# Patient Record
Sex: FEMALE | Race: WHITE | NOT HISPANIC OR LATINO | Employment: UNEMPLOYED | URBAN - METROPOLITAN AREA
[De-identification: names, ages, dates, MRNs, and addresses within clinical notes are randomized per-mention and may not be internally consistent; named-entity substitution may affect disease eponyms.]

---

## 2018-04-19 ENCOUNTER — TELEPHONE (OUTPATIENT)
Dept: MEDICAL GROUP | Facility: PHYSICIAN GROUP | Age: 23
End: 2018-04-19

## 2018-04-19 NOTE — TELEPHONE ENCOUNTER
Future Appointments       Provider Department Center    4/20/2018 11:20 AM Andreina Mena M.D. [facility]        NEW PATIENT VISIT PRE-VISIT PLANNING    1.  [employer] Patient is checked in Patient Demographics? YES    2.  Immunizations were updated in [facility] using WebIZ?: Yes       •  Web Iz Recommendations: HEPATITIS A , HEPATITIS B, HPV, TDAP and VARICELLA (Chicken Pox)     3.  Is this appointment scheduled as a Hospital Follow-Up? No    4.  Patient is due for the following Health Maintenance Topics:   Health Maintenance Due   Topic Date Due   • IMM HEP B VACCINE (1 of 3 - Primary Series) 1993   • IMM HEP A VACCINE (1 of 2 - Standard Series) 04/23/1994   • IMM HPV VACCINE (1 of 3 - Female 3 Dose Series) 04/23/2004   • IMM VARICELLA (CHICKENPOX) VACCINE (1 of 2 - 2 Dose Adolescent Series) 04/23/2006   • CHLAMYDIA SCREENING  04/23/2009   • IMM DTaP/Tdap/Td Vaccine (1 - Tdap) 04/23/2012   • PAP SMEAR  04/23/2014         5.  Reviewed/Updated the following with patient:   •   Preferred Pharmacy? YES       •   Preferred Lab? YES       •   Preferred Communication? YES       •   Allergies? YES       •   Medications? YES. Was Abstract Encounter opened and chart updated? YES       •   Social History? YES. Was Abstract Encounter opened and chart updated? YES       •   Family History (document living status of immediate family members and if + hx of cancer, diabetes, hypertension, hyperlipidemia, heart attack, stroke) YES. Was Abstract Encounter opened and chart updated? YES    6.  Updated Care Team?       •   DME Company (gait device, O2, CPAP, etc.) YES       •   Other Specialists (eye doctor, derm, GYN, cardiology, endo, etc): YES    7.  MDX printed for Provider? NO     8.  Patient was informed to arrive 15 min prior to their   scheduled appointment and bring in their medication bottles.

## 2018-04-20 ENCOUNTER — OFFICE VISIT (OUTPATIENT)
Dept: MEDICAL GROUP | Facility: PHYSICIAN GROUP | Age: 23
End: 2018-04-20
Payer: COMMERCIAL

## 2018-04-20 ENCOUNTER — HOSPITAL ENCOUNTER (OUTPATIENT)
Dept: LAB | Facility: MEDICAL CENTER | Age: 23
End: 2018-04-20
Attending: INTERNAL MEDICINE
Payer: COMMERCIAL

## 2018-04-20 VITALS
DIASTOLIC BLOOD PRESSURE: 62 MMHG | SYSTOLIC BLOOD PRESSURE: 122 MMHG | TEMPERATURE: 98.1 F | WEIGHT: 147 LBS | HEART RATE: 57 BPM | BODY MASS INDEX: 24.49 KG/M2 | HEIGHT: 65 IN | OXYGEN SATURATION: 92 %

## 2018-04-20 DIAGNOSIS — Z31.69 PRE-CONCEPTION COUNSELING: ICD-10-CM

## 2018-04-20 DIAGNOSIS — N92.6 IRREGULAR PERIODS: ICD-10-CM

## 2018-04-20 DIAGNOSIS — Z13.228 ENCOUNTER FOR SCREENING FOR METABOLIC DISORDER: ICD-10-CM

## 2018-04-20 DIAGNOSIS — J30.2 CHRONIC SEASONAL ALLERGIC RHINITIS, UNSPECIFIED TRIGGER: ICD-10-CM

## 2018-04-20 DIAGNOSIS — Z23 NEED FOR VACCINATION: ICD-10-CM

## 2018-04-20 LAB
BASOPHILS # BLD AUTO: 0.6 % (ref 0–1.8)
BASOPHILS # BLD: 0.03 K/UL (ref 0–0.12)
EOSINOPHIL # BLD AUTO: 0.03 K/UL (ref 0–0.51)
EOSINOPHIL NFR BLD: 0.6 % (ref 0–6.9)
ERYTHROCYTE [DISTWIDTH] IN BLOOD BY AUTOMATED COUNT: 41.4 FL (ref 35.9–50)
HCT VFR BLD AUTO: 41.1 % (ref 37–47)
HGB BLD-MCNC: 13.5 G/DL (ref 12–16)
IMM GRANULOCYTES # BLD AUTO: 0 K/UL (ref 0–0.11)
IMM GRANULOCYTES NFR BLD AUTO: 0 % (ref 0–0.9)
LYMPHOCYTES # BLD AUTO: 2.53 K/UL (ref 1–4.8)
LYMPHOCYTES NFR BLD: 46.5 % (ref 22–41)
MCH RBC QN AUTO: 30.2 PG (ref 27–33)
MCHC RBC AUTO-ENTMCNC: 32.8 G/DL (ref 33.6–35)
MCV RBC AUTO: 91.9 FL (ref 81.4–97.8)
MONOCYTES # BLD AUTO: 0.32 K/UL (ref 0–0.85)
MONOCYTES NFR BLD AUTO: 5.9 % (ref 0–13.4)
NEUTROPHILS # BLD AUTO: 2.53 K/UL (ref 2–7.15)
NEUTROPHILS NFR BLD: 46.4 % (ref 44–72)
NRBC # BLD AUTO: 0 K/UL
NRBC BLD-RTO: 0 /100 WBC
PLATELET # BLD AUTO: 257 K/UL (ref 164–446)
PMV BLD AUTO: 10.8 FL (ref 9–12.9)
RBC # BLD AUTO: 4.47 M/UL (ref 4.2–5.4)
WBC # BLD AUTO: 5.4 K/UL (ref 4.8–10.8)

## 2018-04-20 PROCEDURE — 84443 ASSAY THYROID STIM HORMONE: CPT

## 2018-04-20 PROCEDURE — 90686 IIV4 VACC NO PRSV 0.5 ML IM: CPT | Performed by: INTERNAL MEDICINE

## 2018-04-20 PROCEDURE — 90471 IMMUNIZATION ADMIN: CPT | Performed by: INTERNAL MEDICINE

## 2018-04-20 PROCEDURE — 36415 COLL VENOUS BLD VENIPUNCTURE: CPT

## 2018-04-20 PROCEDURE — 99204 OFFICE O/P NEW MOD 45 MIN: CPT | Mod: 25 | Performed by: INTERNAL MEDICINE

## 2018-04-20 PROCEDURE — 85025 COMPLETE CBC W/AUTO DIFF WBC: CPT

## 2018-04-20 RX ORDER — PRENATAL VIT 49/IRON FUM/FOLIC 6.75-0.2MG
6 TABLET ORAL DAILY
Qty: 60 TAB | Refills: 2
Start: 2018-04-20

## 2018-04-20 ASSESSMENT — PATIENT HEALTH QUESTIONNAIRE - PHQ9: CLINICAL INTERPRETATION OF PHQ2 SCORE: 0

## 2018-04-20 NOTE — ASSESSMENT & PLAN NOTE
This is a chronic health problem that is well controlled with current medications and lifestyle measures. On over-the-counter Claritin.

## 2018-04-20 NOTE — ASSESSMENT & PLAN NOTE
This is a chronic health problem that is uncontrolled with current medications and lifestyle measures. Was on the hormonal pills, but trying to conceive and is off the medication since last June 2017. Happens once in every 2 months, the flow being moderate only the first 2 days being heavy and ending by 7 th day. Has on and off abdominal pain midcycle.

## 2018-04-21 LAB — TSH SERPL DL<=0.005 MIU/L-ACNC: 1.97 UIU/ML (ref 0.38–5.33)

## 2018-04-23 ENCOUNTER — TELEPHONE (OUTPATIENT)
Dept: MEDICAL GROUP | Facility: PHYSICIAN GROUP | Age: 23
End: 2018-04-23

## 2018-04-23 NOTE — TELEPHONE ENCOUNTER
----- Message from Andreina Mena M.D. sent at 4/21/2018 12:22 PM PDT -----  All of your labs were normal. You may see some that are highlighted, however these have no clinical significance.  Andreina Mena M.D.

## 2018-04-27 ENCOUNTER — HOSPITAL ENCOUNTER (OUTPATIENT)
Dept: RADIOLOGY | Facility: MEDICAL CENTER | Age: 23
End: 2018-04-27
Attending: INTERNAL MEDICINE
Payer: COMMERCIAL

## 2018-04-27 ENCOUNTER — HOSPITAL ENCOUNTER (OUTPATIENT)
Dept: LAB | Facility: MEDICAL CENTER | Age: 23
End: 2018-04-27
Attending: INTERNAL MEDICINE
Payer: COMMERCIAL

## 2018-04-27 DIAGNOSIS — Z13.228 ENCOUNTER FOR SCREENING FOR METABOLIC DISORDER: ICD-10-CM

## 2018-04-27 DIAGNOSIS — N92.6 IRREGULAR PERIODS: ICD-10-CM

## 2018-04-27 LAB
ALBUMIN SERPL BCP-MCNC: 4.4 G/DL (ref 3.2–4.9)
ALBUMIN/GLOB SERPL: 1.7 G/DL
ALP SERPL-CCNC: 38 U/L (ref 30–99)
ALT SERPL-CCNC: 9 U/L (ref 2–50)
ANION GAP SERPL CALC-SCNC: 7 MMOL/L (ref 0–11.9)
AST SERPL-CCNC: 14 U/L (ref 12–45)
BILIRUB SERPL-MCNC: 0.7 MG/DL (ref 0.1–1.5)
BUN SERPL-MCNC: 17 MG/DL (ref 8–22)
CALCIUM SERPL-MCNC: 8.8 MG/DL (ref 8.5–10.5)
CHLORIDE SERPL-SCNC: 106 MMOL/L (ref 96–112)
CO2 SERPL-SCNC: 26 MMOL/L (ref 20–33)
CREAT SERPL-MCNC: 0.56 MG/DL (ref 0.5–1.4)
GLOBULIN SER CALC-MCNC: 2.6 G/DL (ref 1.9–3.5)
GLUCOSE SERPL-MCNC: 88 MG/DL (ref 65–99)
POTASSIUM SERPL-SCNC: 4.1 MMOL/L (ref 3.6–5.5)
PROT SERPL-MCNC: 7 G/DL (ref 6–8.2)
SODIUM SERPL-SCNC: 139 MMOL/L (ref 135–145)

## 2018-04-27 PROCEDURE — 36415 COLL VENOUS BLD VENIPUNCTURE: CPT

## 2018-04-27 PROCEDURE — 76830 TRANSVAGINAL US NON-OB: CPT

## 2018-04-27 PROCEDURE — 80053 COMPREHEN METABOLIC PANEL: CPT

## 2018-04-28 DIAGNOSIS — N92.6 IRREGULAR PERIODS: ICD-10-CM

## 2018-04-28 DIAGNOSIS — E28.2 PCOS (POLYCYSTIC OVARIAN SYNDROME): ICD-10-CM

## 2018-04-30 ENCOUNTER — TELEPHONE (OUTPATIENT)
Dept: MEDICAL GROUP | Facility: PHYSICIAN GROUP | Age: 23
End: 2018-04-30

## 2018-04-30 NOTE — TELEPHONE ENCOUNTER
----- Message from Andreina Mena M.D. sent at 4/28/2018  4:20 PM PDT -----  Your Transvaginal Ultrasound is positive for PCOS , I am giving referral to Gynecology for further management.  Andreina Mena M.D.

## 2018-04-30 NOTE — TELEPHONE ENCOUNTER
----- Message from Anderina Mena M.D. sent at 4/28/2018  4:00 PM PDT -----  All of your labs were normal. You may see some that are highlighted, however these have no clinical significance.  Andreina Mena M.D.

## 2018-08-06 ENCOUNTER — HOSPITAL ENCOUNTER (OUTPATIENT)
Dept: LAB | Facility: MEDICAL CENTER | Age: 23
End: 2018-08-06
Attending: OBSTETRICS & GYNECOLOGY
Payer: COMMERCIAL

## 2018-08-06 LAB — B-HCG SERPL-ACNC: 3973.4 MIU/ML (ref 0–5)

## 2018-08-06 PROCEDURE — 36415 COLL VENOUS BLD VENIPUNCTURE: CPT

## 2018-08-06 PROCEDURE — 84702 CHORIONIC GONADOTROPIN TEST: CPT

## 2018-08-08 ENCOUNTER — HOSPITAL ENCOUNTER (OUTPATIENT)
Dept: LAB | Facility: MEDICAL CENTER | Age: 23
End: 2018-08-08
Attending: OBSTETRICS & GYNECOLOGY
Payer: COMMERCIAL

## 2018-08-08 LAB — B-HCG SERPL-ACNC: 8502 MIU/ML (ref 0–5)

## 2018-08-08 PROCEDURE — 36415 COLL VENOUS BLD VENIPUNCTURE: CPT

## 2018-08-08 PROCEDURE — 84702 CHORIONIC GONADOTROPIN TEST: CPT

## 2018-09-28 ENCOUNTER — HOSPITAL ENCOUNTER (OUTPATIENT)
Dept: LAB | Facility: MEDICAL CENTER | Age: 23
End: 2018-09-28
Attending: OBSTETRICS & GYNECOLOGY
Payer: COMMERCIAL

## 2018-09-28 PROCEDURE — 36415 COLL VENOUS BLD VENIPUNCTURE: CPT

## 2018-09-28 PROCEDURE — 81508 FTL CGEN ABNOR TWO PROTEINS: CPT

## 2018-10-01 LAB
# FETUSES US: NORMAL
AGE - REPORTED: 25.9 YR
CURRENT SMOKER: NO
FET CRL US.MEAS: 66.2 MM
FET CRL US.MEAS: NORMAL MM
FET NUCHAL FOLD MOM THICKNESS US.MEAS: 1.08
FET NUCHAL FOLD MOM THICKNESS US.MEAS: NORMAL
FET NUCHAL FOLD THICKNESS US.MEAS: 1.67 MM
GA: NORMAL WK
HCG MOM SERPL: 1.05
HCG SERPL-ACNC: NORMAL IU/L
HX OF HEREDITARY DISORDERS: NO
INTEGRATED SCN PATIENT-IMP: NORMAL
NUCHAL TRANSLUCENCY (NT), TWIN B Q0252: NORMAL MM
PAPP-A MOM SERPL: 0.38
PAPP-A SERPL-MCNC: 414.1 NG/ML
PATHOLOGY STUDY: NORMAL
SONOGRAPHER NAME: NORMAL
SONOGRAPHER: NORMAL
SPECIMEN DRAWN SERPL: NORMAL
US DATE: NORMAL

## 2018-11-02 ENCOUNTER — HOSPITAL ENCOUNTER (OUTPATIENT)
Dept: LAB | Facility: MEDICAL CENTER | Age: 23
End: 2018-11-02
Attending: OBSTETRICS & GYNECOLOGY
Payer: COMMERCIAL

## 2018-11-02 LAB
ABO GROUP BLD: NORMAL
APPEARANCE UR: CLEAR
BASOPHILS # BLD AUTO: 0.3 % (ref 0–1.8)
BASOPHILS # BLD: 0.03 K/UL (ref 0–0.12)
BILIRUB UR QL STRIP.AUTO: NEGATIVE
BLD GP AB SCN SERPL QL: NORMAL
COLOR UR: YELLOW
EOSINOPHIL # BLD AUTO: 0.03 K/UL (ref 0–0.51)
EOSINOPHIL NFR BLD: 0.3 % (ref 0–6.9)
ERYTHROCYTE [DISTWIDTH] IN BLOOD BY AUTOMATED COUNT: 41.4 FL (ref 35.9–50)
GLUCOSE UR STRIP.AUTO-MCNC: NEGATIVE MG/DL
HBV SURFACE AG SER QL: NEGATIVE
HCT VFR BLD AUTO: 36.5 % (ref 37–47)
HGB BLD-MCNC: 12.8 G/DL (ref 12–16)
HIV 1+2 AB+HIV1 P24 AG SERPL QL IA: NON REACTIVE
IMM GRANULOCYTES # BLD AUTO: 0.03 K/UL (ref 0–0.11)
IMM GRANULOCYTES NFR BLD AUTO: 0.3 % (ref 0–0.9)
KETONES UR STRIP.AUTO-MCNC: NEGATIVE MG/DL
LEUKOCYTE ESTERASE UR QL STRIP.AUTO: NEGATIVE
LYMPHOCYTES # BLD AUTO: 1.72 K/UL (ref 1–4.8)
LYMPHOCYTES NFR BLD: 17.9 % (ref 22–41)
MCH RBC QN AUTO: 32.3 PG (ref 27–33)
MCHC RBC AUTO-ENTMCNC: 35.1 G/DL (ref 33.6–35)
MCV RBC AUTO: 92.2 FL (ref 81.4–97.8)
MICRO URNS: NORMAL
MONOCYTES # BLD AUTO: 0.41 K/UL (ref 0–0.85)
MONOCYTES NFR BLD AUTO: 4.3 % (ref 0–13.4)
NEUTROPHILS # BLD AUTO: 7.37 K/UL (ref 2–7.15)
NEUTROPHILS NFR BLD: 76.9 % (ref 44–72)
NITRITE UR QL STRIP.AUTO: NEGATIVE
NRBC # BLD AUTO: 0 K/UL
NRBC BLD-RTO: 0 /100 WBC
PH UR STRIP.AUTO: 8 [PH]
PLATELET # BLD AUTO: 249 K/UL (ref 164–446)
PMV BLD AUTO: 11.2 FL (ref 9–12.9)
PROT UR QL STRIP: NEGATIVE MG/DL
RBC # BLD AUTO: 3.96 M/UL (ref 4.2–5.4)
RBC UR QL AUTO: NEGATIVE
RH BLD: NORMAL
RUBV AB SER QL: 106.6 IU/ML
SP GR UR STRIP.AUTO: 1.01
TREPONEMA PALLIDUM IGG+IGM AB [PRESENCE] IN SERUM OR PLASMA BY IMMUNOASSAY: NON REACTIVE
UROBILINOGEN UR STRIP.AUTO-MCNC: 1 MG/DL
WBC # BLD AUTO: 9.6 K/UL (ref 4.8–10.8)

## 2018-11-02 PROCEDURE — 86900 BLOOD TYPING SEROLOGIC ABO: CPT

## 2018-11-02 PROCEDURE — 87086 URINE CULTURE/COLONY COUNT: CPT

## 2018-11-02 PROCEDURE — 86762 RUBELLA ANTIBODY: CPT

## 2018-11-02 PROCEDURE — 87389 HIV-1 AG W/HIV-1&-2 AB AG IA: CPT

## 2018-11-02 PROCEDURE — 86850 RBC ANTIBODY SCREEN: CPT

## 2018-11-02 PROCEDURE — 87340 HEPATITIS B SURFACE AG IA: CPT

## 2018-11-02 PROCEDURE — 86901 BLOOD TYPING SEROLOGIC RH(D): CPT

## 2018-11-02 PROCEDURE — 36415 COLL VENOUS BLD VENIPUNCTURE: CPT

## 2018-11-02 PROCEDURE — 82105 ALPHA-FETOPROTEIN SERUM: CPT

## 2018-11-02 PROCEDURE — 86780 TREPONEMA PALLIDUM: CPT

## 2018-11-02 PROCEDURE — 85025 COMPLETE CBC W/AUTO DIFF WBC: CPT

## 2018-11-02 PROCEDURE — 81003 URINALYSIS AUTO W/O SCOPE: CPT

## 2018-11-05 LAB
BACTERIA UR CULT: NORMAL
SIGNIFICANT IND 70042: NORMAL
SITE SITE: NORMAL
SOURCE SOURCE: NORMAL

## 2018-11-06 LAB
# FETUSES US: NORMAL
AFP MOM SERPL: 0.84
AFP SERPL-MCNC: 34 NG/ML
AGE - REPORTED: 26 YR
CURRENT SMOKER: NO
FAMILY MEMBER DISEASES HX: NO
GA METHOD: NORMAL
GA: NORMAL WK
IDDM PATIENT QL: NO
INTEGRATED SCN PATIENT-IMP: NORMAL
SPECIMEN DRAWN SERPL: NORMAL

## 2018-12-19 ENCOUNTER — HOSPITAL ENCOUNTER (OUTPATIENT)
Dept: LAB | Facility: MEDICAL CENTER | Age: 23
End: 2018-12-19
Attending: OBSTETRICS & GYNECOLOGY
Payer: COMMERCIAL

## 2018-12-19 LAB
GLUCOSE 1H P 50 G GLC PO SERPL-MCNC: 127 MG/DL (ref 70–139)
HCT VFR BLD AUTO: 35.4 % (ref 37–47)
HGB BLD-MCNC: 12.2 G/DL (ref 12–16)
PLATELET # BLD AUTO: 247 K/UL (ref 164–446)

## 2018-12-19 PROCEDURE — 36415 COLL VENOUS BLD VENIPUNCTURE: CPT

## 2018-12-19 PROCEDURE — 86780 TREPONEMA PALLIDUM: CPT

## 2018-12-19 PROCEDURE — 82950 GLUCOSE TEST: CPT

## 2018-12-19 PROCEDURE — 85018 HEMOGLOBIN: CPT

## 2018-12-19 PROCEDURE — 85049 AUTOMATED PLATELET COUNT: CPT

## 2018-12-19 PROCEDURE — 85014 HEMATOCRIT: CPT

## 2018-12-20 LAB — TREPONEMA PALLIDUM IGG+IGM AB [PRESENCE] IN SERUM OR PLASMA BY IMMUNOASSAY: NON REACTIVE

## 2019-01-24 ENCOUNTER — APPOINTMENT (OUTPATIENT)
Dept: OTHER | Facility: IMAGING CENTER | Age: 24
End: 2019-01-24

## 2019-02-22 ENCOUNTER — HOSPITAL ENCOUNTER (OUTPATIENT)
Dept: LAB | Facility: MEDICAL CENTER | Age: 24
End: 2019-02-22
Attending: OBSTETRICS & GYNECOLOGY
Payer: COMMERCIAL

## 2019-02-22 PROCEDURE — 87150 DNA/RNA AMPLIFIED PROBE: CPT

## 2019-02-22 PROCEDURE — 87081 CULTURE SCREEN ONLY: CPT

## 2019-02-23 LAB — GP B STREP DNA SPEC QL NAA+PROBE: NEGATIVE

## 2019-02-28 ENCOUNTER — HOSPITAL ENCOUNTER (INPATIENT)
Facility: MEDICAL CENTER | Age: 24
LOS: 4 days | End: 2019-03-05
Attending: OBSTETRICS & GYNECOLOGY | Admitting: OBSTETRICS & GYNECOLOGY
Payer: COMMERCIAL

## 2019-02-28 DIAGNOSIS — Z09 SURGERY FOLLOW-UP: ICD-10-CM

## 2019-02-28 LAB — HOLDING TUBE BB 8507: NORMAL

## 2019-02-28 PROCEDURE — 81002 URINALYSIS NONAUTO W/O SCOPE: CPT

## 2019-02-28 PROCEDURE — 85025 COMPLETE CBC W/AUTO DIFF WBC: CPT

## 2019-02-28 PROCEDURE — 700105 HCHG RX REV CODE 258: Performed by: OBSTETRICS & GYNECOLOGY

## 2019-02-28 PROCEDURE — 84550 ASSAY OF BLOOD/URIC ACID: CPT

## 2019-02-28 PROCEDURE — 80053 COMPREHEN METABOLIC PANEL: CPT

## 2019-02-28 RX ORDER — HYDRALAZINE HYDROCHLORIDE 20 MG/ML
5-10 INJECTION INTRAMUSCULAR; INTRAVENOUS PRN
Status: DISCONTINUED | OUTPATIENT
Start: 2019-02-28 | End: 2019-03-01 | Stop reason: HOSPADM

## 2019-02-28 RX ORDER — BETAMETHASONE SODIUM PHOSPHATE AND BETAMETHASONE ACETATE 3; 3 MG/ML; MG/ML
12 INJECTION, SUSPENSION INTRA-ARTICULAR; INTRALESIONAL; INTRAMUSCULAR; SOFT TISSUE EVERY 24 HOURS
Status: DISCONTINUED | OUTPATIENT
Start: 2019-03-01 | End: 2019-03-01

## 2019-02-28 RX ORDER — LABETALOL HYDROCHLORIDE 5 MG/ML
20-80 INJECTION, SOLUTION INTRAVENOUS PRN
Status: DISCONTINUED | OUTPATIENT
Start: 2019-02-28 | End: 2019-03-01 | Stop reason: HOSPADM

## 2019-02-28 RX ADMIN — SODIUM CHLORIDE, POTASSIUM CHLORIDE, SODIUM LACTATE AND CALCIUM CHLORIDE: 600; 310; 30; 20 INJECTION, SOLUTION INTRAVENOUS at 12:53

## 2019-03-01 LAB
ALBUMIN SERPL BCP-MCNC: 2.9 G/DL (ref 3.2–4.9)
ALBUMIN SERPL BCP-MCNC: 3.2 G/DL (ref 3.2–4.9)
ALBUMIN/GLOB SERPL: 1.1 G/DL
ALBUMIN/GLOB SERPL: 1.5 G/DL
ALP SERPL-CCNC: 121 U/L (ref 30–99)
ALP SERPL-CCNC: 94 U/L (ref 30–99)
ALT SERPL-CCNC: 129 U/L (ref 2–50)
ALT SERPL-CCNC: 168 U/L (ref 2–50)
ANION GAP SERPL CALC-SCNC: 10 MMOL/L (ref 0–11.9)
ANION GAP SERPL CALC-SCNC: 6 MMOL/L (ref 0–11.9)
APPEARANCE UR: ABNORMAL
AST SERPL-CCNC: 209 U/L (ref 12–45)
AST SERPL-CCNC: 99 U/L (ref 12–45)
BASOPHILS # BLD AUTO: 0.2 % (ref 0–1.8)
BASOPHILS # BLD AUTO: 0.3 % (ref 0–1.8)
BASOPHILS # BLD: 0.04 K/UL (ref 0–0.12)
BASOPHILS # BLD: 0.06 K/UL (ref 0–0.12)
BILIRUB SERPL-MCNC: 0.6 MG/DL (ref 0.1–1.5)
BILIRUB SERPL-MCNC: 1.5 MG/DL (ref 0.1–1.5)
BUN SERPL-MCNC: 10 MG/DL (ref 8–22)
BUN SERPL-MCNC: 14 MG/DL (ref 8–22)
CALCIUM SERPL-MCNC: 7 MG/DL (ref 8.5–10.5)
CALCIUM SERPL-MCNC: 8.5 MG/DL (ref 8.5–10.5)
CHLORIDE SERPL-SCNC: 105 MMOL/L (ref 96–112)
CHLORIDE SERPL-SCNC: 108 MMOL/L (ref 96–112)
CO2 SERPL-SCNC: 18 MMOL/L (ref 20–33)
CO2 SERPL-SCNC: 20 MMOL/L (ref 20–33)
COLOR UR AUTO: ABNORMAL
CREAT SERPL-MCNC: 0.64 MG/DL (ref 0.5–1.4)
CREAT SERPL-MCNC: 0.65 MG/DL (ref 0.5–1.4)
EOSINOPHIL # BLD AUTO: 0 K/UL (ref 0–0.51)
EOSINOPHIL # BLD AUTO: 0.02 K/UL (ref 0–0.51)
EOSINOPHIL NFR BLD: 0 % (ref 0–6.9)
EOSINOPHIL NFR BLD: 0.1 % (ref 0–6.9)
ERYTHROCYTE [DISTWIDTH] IN BLOOD BY AUTOMATED COUNT: 43.4 FL (ref 35.9–50)
ERYTHROCYTE [DISTWIDTH] IN BLOOD BY AUTOMATED COUNT: 45.4 FL (ref 35.9–50)
GLOBULIN SER CALC-MCNC: 2 G/DL (ref 1.9–3.5)
GLOBULIN SER CALC-MCNC: 2.8 G/DL (ref 1.9–3.5)
GLUCOSE SERPL-MCNC: 131 MG/DL (ref 65–99)
GLUCOSE SERPL-MCNC: 89 MG/DL (ref 65–99)
GLUCOSE UR QL STRIP.AUTO: NEGATIVE MG/DL
HCT VFR BLD AUTO: 28.8 % (ref 37–47)
HCT VFR BLD AUTO: 37.1 % (ref 37–47)
HGB BLD-MCNC: 10.1 G/DL (ref 12–16)
HGB BLD-MCNC: 13.1 G/DL (ref 12–16)
IMM GRANULOCYTES # BLD AUTO: 0.12 K/UL (ref 0–0.11)
IMM GRANULOCYTES # BLD AUTO: 0.13 K/UL (ref 0–0.11)
IMM GRANULOCYTES NFR BLD AUTO: 0.7 % (ref 0–0.9)
IMM GRANULOCYTES NFR BLD AUTO: 0.8 % (ref 0–0.9)
KETONES UR QL STRIP.AUTO: ABNORMAL MG/DL
LEUKOCYTE ESTERASE UR QL STRIP.AUTO: NEGATIVE
LYMPHOCYTES # BLD AUTO: 1.35 K/UL (ref 1–4.8)
LYMPHOCYTES # BLD AUTO: 1.64 K/UL (ref 1–4.8)
LYMPHOCYTES NFR BLD: 7.9 % (ref 22–41)
LYMPHOCYTES NFR BLD: 9.4 % (ref 22–41)
MAGNESIUM SERPL-MCNC: 4 MG/DL (ref 1.5–2.5)
MAGNESIUM SERPL-MCNC: 4.6 MG/DL (ref 1.5–2.5)
MAGNESIUM SERPL-MCNC: 5.3 MG/DL (ref 1.5–2.5)
MAGNESIUM SERPL-MCNC: 6.1 MG/DL (ref 1.5–2.5)
MCH RBC QN AUTO: 32.3 PG (ref 27–33)
MCH RBC QN AUTO: 32.7 PG (ref 27–33)
MCHC RBC AUTO-ENTMCNC: 35.1 G/DL (ref 33.6–35)
MCHC RBC AUTO-ENTMCNC: 35.3 G/DL (ref 33.6–35)
MCV RBC AUTO: 91.6 FL (ref 81.4–97.8)
MCV RBC AUTO: 93.2 FL (ref 81.4–97.8)
MONOCYTES # BLD AUTO: 0.6 K/UL (ref 0–0.85)
MONOCYTES # BLD AUTO: 0.97 K/UL (ref 0–0.85)
MONOCYTES NFR BLD AUTO: 3.5 % (ref 0–13.4)
MONOCYTES NFR BLD AUTO: 5.7 % (ref 0–13.4)
NEUTROPHILS # BLD AUTO: 14.48 K/UL (ref 2–7.15)
NEUTROPHILS # BLD AUTO: 14.97 K/UL (ref 2–7.15)
NEUTROPHILS NFR BLD: 85.3 % (ref 44–72)
NEUTROPHILS NFR BLD: 86.1 % (ref 44–72)
NITRITE UR QL STRIP.AUTO: NEGATIVE
NRBC # BLD AUTO: 0 K/UL
NRBC # BLD AUTO: 0.02 K/UL
NRBC BLD-RTO: 0 /100 WBC
NRBC BLD-RTO: 0.1 /100 WBC
PH UR STRIP.AUTO: 5 [PH]
PLATELET # BLD AUTO: 53 K/UL (ref 164–446)
PLATELET # BLD AUTO: 61 K/UL (ref 164–446)
PMV BLD AUTO: 12.9 FL (ref 9–12.9)
PMV BLD AUTO: 13.7 FL (ref 9–12.9)
POTASSIUM SERPL-SCNC: 3.8 MMOL/L (ref 3.6–5.5)
POTASSIUM SERPL-SCNC: 4.2 MMOL/L (ref 3.6–5.5)
PROT SERPL-MCNC: 4.9 G/DL (ref 6–8.2)
PROT SERPL-MCNC: 6 G/DL (ref 6–8.2)
PROT UR QL STRIP: >=300 MG/DL
RBC # BLD AUTO: 3.09 M/UL (ref 4.2–5.4)
RBC # BLD AUTO: 4.05 M/UL (ref 4.2–5.4)
RBC UR QL AUTO: ABNORMAL
SODIUM SERPL-SCNC: 131 MMOL/L (ref 135–145)
SODIUM SERPL-SCNC: 136 MMOL/L (ref 135–145)
SP GR UR: >=1.03
URATE SERPL-MCNC: 6.1 MG/DL (ref 1.9–8.2)
WBC # BLD AUTO: 17 K/UL (ref 4.8–10.8)
WBC # BLD AUTO: 17.4 K/UL (ref 4.8–10.8)

## 2019-03-01 PROCEDURE — A9270 NON-COVERED ITEM OR SERVICE: HCPCS | Performed by: ANESTHESIOLOGY

## 2019-03-01 PROCEDURE — 83735 ASSAY OF MAGNESIUM: CPT

## 2019-03-01 PROCEDURE — 700111 HCHG RX REV CODE 636 W/ 250 OVERRIDE (IP): Performed by: OBSTETRICS & GYNECOLOGY

## 2019-03-01 PROCEDURE — 700112 HCHG RX REV CODE 229: Performed by: OBSTETRICS & GYNECOLOGY

## 2019-03-01 PROCEDURE — 304964 HCHG RECOVERY ROOM TIME 1HR: Performed by: OBSTETRICS & GYNECOLOGY

## 2019-03-01 PROCEDURE — 305385 HCHG SURGICAL SERVICES 1/4 HOUR: Performed by: OBSTETRICS & GYNECOLOGY

## 2019-03-01 PROCEDURE — 700101 HCHG RX REV CODE 250

## 2019-03-01 PROCEDURE — 700105 HCHG RX REV CODE 258: Performed by: OBSTETRICS & GYNECOLOGY

## 2019-03-01 PROCEDURE — 306828 HCHG ANES-TIME GENERAL: Performed by: OBSTETRICS & GYNECOLOGY

## 2019-03-01 PROCEDURE — 80053 COMPREHEN METABOLIC PANEL: CPT

## 2019-03-01 PROCEDURE — 700102 HCHG RX REV CODE 250 W/ 637 OVERRIDE(OP): Performed by: ANESTHESIOLOGY

## 2019-03-01 PROCEDURE — 770002 HCHG ROOM/CARE - OB PRIVATE (112)

## 2019-03-01 PROCEDURE — 700101 HCHG RX REV CODE 250: Performed by: OBSTETRICS & GYNECOLOGY

## 2019-03-01 PROCEDURE — 700111 HCHG RX REV CODE 636 W/ 250 OVERRIDE (IP)

## 2019-03-01 PROCEDURE — 96372 THER/PROPH/DIAG INJ SC/IM: CPT

## 2019-03-01 PROCEDURE — 85025 COMPLETE CBC W/AUTO DIFF WBC: CPT

## 2019-03-01 PROCEDURE — 304966 HCHG RECOVERY SVSC TIME ADDL 1/2 HR: Performed by: OBSTETRICS & GYNECOLOGY

## 2019-03-01 PROCEDURE — 700111 HCHG RX REV CODE 636 W/ 250 OVERRIDE (IP): Performed by: ANESTHESIOLOGY

## 2019-03-01 PROCEDURE — A9270 NON-COVERED ITEM OR SERVICE: HCPCS | Performed by: OBSTETRICS & GYNECOLOGY

## 2019-03-01 PROCEDURE — 700102 HCHG RX REV CODE 250 W/ 637 OVERRIDE(OP): Performed by: OBSTETRICS & GYNECOLOGY

## 2019-03-01 PROCEDURE — 59514 CESAREAN DELIVERY ONLY: CPT

## 2019-03-01 PROCEDURE — 36415 COLL VENOUS BLD VENIPUNCTURE: CPT

## 2019-03-01 RX ORDER — ONDANSETRON 2 MG/ML
4 INJECTION INTRAMUSCULAR; INTRAVENOUS
Status: DISCONTINUED | OUTPATIENT
Start: 2019-03-01 | End: 2019-03-01 | Stop reason: HOSPADM

## 2019-03-01 RX ORDER — MISOPROSTOL 200 UG/1
800 TABLET ORAL
Status: DISCONTINUED | OUTPATIENT
Start: 2019-03-01 | End: 2019-03-05 | Stop reason: HOSPADM

## 2019-03-01 RX ORDER — SODIUM CHLORIDE, SODIUM LACTATE, POTASSIUM CHLORIDE, CALCIUM CHLORIDE 600; 310; 30; 20 MG/100ML; MG/100ML; MG/100ML; MG/100ML
INJECTION, SOLUTION INTRAVENOUS CONTINUOUS
Status: DISCONTINUED | OUTPATIENT
Start: 2019-03-01 | End: 2019-03-05 | Stop reason: HOSPADM

## 2019-03-01 RX ORDER — HYDRALAZINE HYDROCHLORIDE 20 MG/ML
5 INJECTION INTRAMUSCULAR; INTRAVENOUS
Status: DISCONTINUED | OUTPATIENT
Start: 2019-03-01 | End: 2019-03-01 | Stop reason: HOSPADM

## 2019-03-01 RX ORDER — SODIUM CHLORIDE, SODIUM GLUCONATE, SODIUM ACETATE, POTASSIUM CHLORIDE AND MAGNESIUM CHLORIDE 526; 502; 368; 37; 30 MG/100ML; MG/100ML; MG/100ML; MG/100ML; MG/100ML
1500 INJECTION, SOLUTION INTRAVENOUS ONCE
Status: DISCONTINUED | OUTPATIENT
Start: 2019-03-01 | End: 2019-03-01 | Stop reason: HOSPADM

## 2019-03-01 RX ORDER — OXYCODONE HYDROCHLORIDE 5 MG/1
5 TABLET ORAL EVERY 4 HOURS PRN
Status: DISCONTINUED | OUTPATIENT
Start: 2019-03-01 | End: 2019-03-05 | Stop reason: HOSPADM

## 2019-03-01 RX ORDER — MORPHINE SULFATE 4 MG/ML
4 INJECTION, SOLUTION INTRAMUSCULAR; INTRAVENOUS
Status: DISCONTINUED | OUTPATIENT
Start: 2019-03-01 | End: 2019-03-05 | Stop reason: HOSPADM

## 2019-03-01 RX ORDER — OXYCODONE HYDROCHLORIDE AND ACETAMINOPHEN 5; 325 MG/1; MG/1
1 TABLET ORAL
Status: COMPLETED | OUTPATIENT
Start: 2019-03-01 | End: 2019-03-01

## 2019-03-01 RX ORDER — OXYCODONE HYDROCHLORIDE AND ACETAMINOPHEN 5; 325 MG/1; MG/1
2 TABLET ORAL
Status: COMPLETED | OUTPATIENT
Start: 2019-03-01 | End: 2019-03-01

## 2019-03-01 RX ORDER — METOCLOPRAMIDE HYDROCHLORIDE 5 MG/ML
10 INJECTION INTRAMUSCULAR; INTRAVENOUS ONCE
Status: COMPLETED | OUTPATIENT
Start: 2019-03-01 | End: 2019-03-01

## 2019-03-01 RX ORDER — SODIUM CHLORIDE, SODIUM LACTATE, POTASSIUM CHLORIDE, CALCIUM CHLORIDE 600; 310; 30; 20 MG/100ML; MG/100ML; MG/100ML; MG/100ML
INJECTION, SOLUTION INTRAVENOUS PRN
Status: DISCONTINUED | OUTPATIENT
Start: 2019-03-01 | End: 2019-03-05 | Stop reason: HOSPADM

## 2019-03-01 RX ORDER — LORAZEPAM 2 MG/ML
0.5 INJECTION INTRAMUSCULAR
Status: DISCONTINUED | OUTPATIENT
Start: 2019-03-01 | End: 2019-03-01 | Stop reason: HOSPADM

## 2019-03-01 RX ORDER — DIPHENHYDRAMINE HYDROCHLORIDE 50 MG/ML
12.5 INJECTION INTRAMUSCULAR; INTRAVENOUS
Status: DISCONTINUED | OUTPATIENT
Start: 2019-03-01 | End: 2019-03-01 | Stop reason: HOSPADM

## 2019-03-01 RX ORDER — MAGNESIUM SULFATE HEPTAHYDRATE 40 MG/ML
INJECTION, SOLUTION INTRAVENOUS
Status: COMPLETED
Start: 2019-03-01 | End: 2019-03-01

## 2019-03-01 RX ORDER — IBUPROFEN 600 MG/1
600 TABLET ORAL EVERY 6 HOURS PRN
Status: DISCONTINUED | OUTPATIENT
Start: 2019-03-01 | End: 2019-03-05 | Stop reason: HOSPADM

## 2019-03-01 RX ORDER — ONDANSETRON 2 MG/ML
4 INJECTION INTRAMUSCULAR; INTRAVENOUS EVERY 6 HOURS PRN
Status: DISCONTINUED | OUTPATIENT
Start: 2019-03-01 | End: 2019-03-05 | Stop reason: HOSPADM

## 2019-03-01 RX ORDER — CALCIUM GLUCONATE 94 MG/ML
1 INJECTION, SOLUTION INTRAVENOUS
Status: DISCONTINUED | OUTPATIENT
Start: 2019-03-01 | End: 2019-03-01 | Stop reason: HOSPADM

## 2019-03-01 RX ORDER — METOPROLOL TARTRATE 1 MG/ML
1 INJECTION, SOLUTION INTRAVENOUS
Status: DISCONTINUED | OUTPATIENT
Start: 2019-03-01 | End: 2019-03-01 | Stop reason: HOSPADM

## 2019-03-01 RX ORDER — HALOPERIDOL 5 MG/ML
1 INJECTION INTRAMUSCULAR
Status: DISCONTINUED | OUTPATIENT
Start: 2019-03-01 | End: 2019-03-01 | Stop reason: HOSPADM

## 2019-03-01 RX ORDER — SIMETHICONE 80 MG
80 TABLET,CHEWABLE ORAL 4 TIMES DAILY PRN
Status: DISCONTINUED | OUTPATIENT
Start: 2019-03-01 | End: 2019-03-05 | Stop reason: HOSPADM

## 2019-03-01 RX ORDER — MEPERIDINE HYDROCHLORIDE 25 MG/ML
12.5 INJECTION INTRAMUSCULAR; INTRAVENOUS; SUBCUTANEOUS
Status: DISCONTINUED | OUTPATIENT
Start: 2019-03-01 | End: 2019-03-01 | Stop reason: HOSPADM

## 2019-03-01 RX ORDER — CARBOPROST TROMETHAMINE 250 UG/ML
250 INJECTION, SOLUTION INTRAMUSCULAR
Status: DISCONTINUED | OUTPATIENT
Start: 2019-03-01 | End: 2019-03-05 | Stop reason: HOSPADM

## 2019-03-01 RX ORDER — HYDROMORPHONE HYDROCHLORIDE 1 MG/ML
0.1 INJECTION, SOLUTION INTRAMUSCULAR; INTRAVENOUS; SUBCUTANEOUS
Status: DISCONTINUED | OUTPATIENT
Start: 2019-03-01 | End: 2019-03-01 | Stop reason: HOSPADM

## 2019-03-01 RX ORDER — CALCIUM GLUCONATE 94 MG/ML
1 INJECTION, SOLUTION INTRAVENOUS
Status: DISCONTINUED | OUTPATIENT
Start: 2019-03-01 | End: 2019-03-02

## 2019-03-01 RX ORDER — HYDROMORPHONE HYDROCHLORIDE 1 MG/ML
0.4 INJECTION, SOLUTION INTRAMUSCULAR; INTRAVENOUS; SUBCUTANEOUS
Status: DISCONTINUED | OUTPATIENT
Start: 2019-03-01 | End: 2019-03-01 | Stop reason: HOSPADM

## 2019-03-01 RX ORDER — HYDROMORPHONE HYDROCHLORIDE 1 MG/ML
0.2 INJECTION, SOLUTION INTRAMUSCULAR; INTRAVENOUS; SUBCUTANEOUS
Status: DISCONTINUED | OUTPATIENT
Start: 2019-03-01 | End: 2019-03-01 | Stop reason: HOSPADM

## 2019-03-01 RX ORDER — DOCUSATE SODIUM 100 MG/1
100 CAPSULE, LIQUID FILLED ORAL 2 TIMES DAILY PRN
Status: DISCONTINUED | OUTPATIENT
Start: 2019-03-01 | End: 2019-03-05 | Stop reason: HOSPADM

## 2019-03-01 RX ORDER — MAGNESIUM SULFATE HEPTAHYDRATE 40 MG/ML
2 INJECTION, SOLUTION INTRAVENOUS CONTINUOUS
Status: CANCELLED | OUTPATIENT
Start: 2019-03-01 | End: 2019-03-02

## 2019-03-01 RX ORDER — MAGNESIUM SULFATE HEPTAHYDRATE 40 MG/ML
2 INJECTION, SOLUTION INTRAVENOUS CONTINUOUS
Status: DISCONTINUED | OUTPATIENT
Start: 2019-03-01 | End: 2019-03-01

## 2019-03-01 RX ORDER — MAGNESIUM SULFATE HEPTAHYDRATE 40 MG/ML
2 INJECTION, SOLUTION INTRAVENOUS CONTINUOUS
Status: DISCONTINUED | OUTPATIENT
Start: 2019-03-01 | End: 2019-03-02

## 2019-03-01 RX ORDER — ONDANSETRON 4 MG/1
4 TABLET, ORALLY DISINTEGRATING ORAL EVERY 6 HOURS PRN
Status: DISCONTINUED | OUTPATIENT
Start: 2019-03-01 | End: 2019-03-05 | Stop reason: HOSPADM

## 2019-03-01 RX ORDER — CITRIC ACID/SODIUM CITRATE 334-500MG
30 SOLUTION, ORAL ORAL ONCE
Status: COMPLETED | OUTPATIENT
Start: 2019-03-01 | End: 2019-03-01

## 2019-03-01 RX ORDER — MAGNESIUM SULFATE HEPTAHYDRATE 40 MG/ML
4 INJECTION, SOLUTION INTRAVENOUS ONCE
Status: COMPLETED | OUTPATIENT
Start: 2019-03-01 | End: 2019-03-01

## 2019-03-01 RX ORDER — VITAMIN A ACETATE, BETA CAROTENE, ASCORBIC ACID, CHOLECALCIFEROL, .ALPHA.-TOCOPHEROL ACETATE, DL-, THIAMINE MONONITRATE, RIBOFLAVIN, NIACINAMIDE, PYRIDOXINE HYDROCHLORIDE, FOLIC ACID, CYANOCOBALAMIN, CALCIUM CARBONATE, FERROUS FUMARATE, ZINC OXIDE, CUPRIC OXIDE 3080; 12; 120; 400; 1; 1.84; 3; 20; 22; 920; 25; 200; 27; 10; 2 [IU]/1; UG/1; MG/1; [IU]/1; MG/1; MG/1; MG/1; MG/1; MG/1; [IU]/1; MG/1; MG/1; MG/1; MG/1; MG/1
1 TABLET, FILM COATED ORAL EVERY MORNING
Status: DISCONTINUED | OUTPATIENT
Start: 2019-03-01 | End: 2019-03-05 | Stop reason: HOSPADM

## 2019-03-01 RX ORDER — DIPHENHYDRAMINE HYDROCHLORIDE 50 MG/ML
25 INJECTION INTRAMUSCULAR; INTRAVENOUS EVERY 6 HOURS PRN
Status: DISCONTINUED | OUTPATIENT
Start: 2019-03-01 | End: 2019-03-05 | Stop reason: HOSPADM

## 2019-03-01 RX ORDER — DIPHENHYDRAMINE HCL 25 MG
25 TABLET ORAL EVERY 6 HOURS PRN
Status: DISCONTINUED | OUTPATIENT
Start: 2019-03-01 | End: 2019-03-05 | Stop reason: HOSPADM

## 2019-03-01 RX ORDER — OXYCODONE HYDROCHLORIDE 10 MG/1
10 TABLET ORAL EVERY 4 HOURS PRN
Status: DISCONTINUED | OUTPATIENT
Start: 2019-03-01 | End: 2019-03-05 | Stop reason: HOSPADM

## 2019-03-01 RX ADMIN — OXYCODONE AND ACETAMINOPHEN 2 TABLET: 5; 325 TABLET ORAL at 05:31

## 2019-03-01 RX ADMIN — Medication 125 ML/HR: at 03:45

## 2019-03-01 RX ADMIN — SODIUM CITRATE AND CITRIC ACID MONOHYDRATE 30 ML: 500; 334 SOLUTION ORAL at 01:59

## 2019-03-01 RX ADMIN — FENTANYL CITRATE 50 MCG: 50 INJECTION, SOLUTION INTRAMUSCULAR; INTRAVENOUS at 03:34

## 2019-03-01 RX ADMIN — FAMOTIDINE 20 MG: 10 INJECTION INTRAVENOUS at 02:00

## 2019-03-01 RX ADMIN — DOCUSATE SODIUM 100 MG: 100 CAPSULE, LIQUID FILLED ORAL at 20:30

## 2019-03-01 RX ADMIN — MAGNESIUM SULFATE HEPTAHYDRATE 4 G: 40 INJECTION, SOLUTION INTRAVENOUS at 00:30

## 2019-03-01 RX ADMIN — IBUPROFEN 600 MG: 600 TABLET ORAL at 22:52

## 2019-03-01 RX ADMIN — METOCLOPRAMIDE 10 MG: 5 INJECTION, SOLUTION INTRAMUSCULAR; INTRAVENOUS at 02:00

## 2019-03-01 RX ADMIN — Medication 1 TABLET: at 05:31

## 2019-03-01 RX ADMIN — LABETALOL HYDROCHLORIDE 10 MG: 5 INJECTION INTRAVENOUS at 00:13

## 2019-03-01 RX ADMIN — MAGNESIUM SULFATE HEPTAHYDRATE 2 G/HR: 40 INJECTION, SOLUTION INTRAVENOUS at 21:16

## 2019-03-01 RX ADMIN — SODIUM CHLORIDE, POTASSIUM CHLORIDE, SODIUM LACTATE AND CALCIUM CHLORIDE: 600; 310; 30; 20 INJECTION, SOLUTION INTRAVENOUS at 12:17

## 2019-03-01 RX ADMIN — MAGNESIUM SULFATE HEPTAHYDRATE 2 G/HR: 40 INJECTION, SOLUTION INTRAVENOUS at 00:52

## 2019-03-01 RX ADMIN — SIMETHICONE CHEW TAB 80 MG 80 MG: 80 TABLET ORAL at 20:26

## 2019-03-01 RX ADMIN — IBUPROFEN 600 MG: 600 TABLET ORAL at 12:32

## 2019-03-01 RX ADMIN — OXYCODONE HYDROCHLORIDE 5 MG: 5 TABLET ORAL at 20:29

## 2019-03-01 RX ADMIN — BETAMETHASONE SODIUM PHOSPHATE AND BETAMETHASONE ACETATE 12 MG: 3; 3 INJECTION, SUSPENSION INTRA-ARTICULAR; INTRALESIONAL; INTRAMUSCULAR at 01:30

## 2019-03-01 ASSESSMENT — PATIENT HEALTH QUESTIONNAIRE - PHQ9
SUM OF ALL RESPONSES TO PHQ9 QUESTIONS 1 AND 2: 0
2. FEELING DOWN, DEPRESSED, IRRITABLE, OR HOPELESS: NOT AT ALL
1. LITTLE INTEREST OR PLEASURE IN DOING THINGS: NOT AT ALL
1. LITTLE INTEREST OR PLEASURE IN DOING THINGS: NOT AT ALL
2. FEELING DOWN, DEPRESSED, IRRITABLE, OR HOPELESS: NOT AT ALL
SUM OF ALL RESPONSES TO PHQ9 QUESTIONS 1 AND 2: 0

## 2019-03-01 ASSESSMENT — LIFESTYLE VARIABLES
ALCOHOL_USE: NO
EVER_SMOKED: NEVER

## 2019-03-01 NOTE — PROGRESS NOTES
Labs returned and are concerning for HELLP syndrome.  Rec proceeding primary C/S.  Anesthesia is recommending GETA.  Results D/W pt.  P/C/R/B of C/S reviewed.  Consent signed.  Will proceed with C/S for HELLP

## 2019-03-01 NOTE — CARE PLAN
Problem: Infection  Goal: Will remain free from infection  Outcome: PROGRESSING AS EXPECTED  Pt remains free of any sign of infection.    Problem: Knowledge Deficit  Goal: Knowledge of disease process/condition, treatment plan, diagnostic tests, and medications will improve  Outcome: PROGRESSING AS EXPECTED  POC was discussed with pt andher .

## 2019-03-01 NOTE — PROGRESS NOTES
edc 2019  34.6 weeks gbs negative    Complaints: epigastric pain    2330: pt to labor an delivery, c/o epigastric pain since Tuesday.  Pt states she had a high blood pressure in her ob office last week (140/80).  efm and toco applied. Pt denies headache, blurry vision.  Reflexes +2/+2, no clonus.  Blood pressure elevated 160's/110's.  Call to dr. Ann, report given.  Pt urine sample is brown in color with +3 protein.      2345: iv started, labs drawn.  Report to [place] cifuentes,  Dr. ann at , ultrasound to confirm vertex presentation.  sve closed/thick/high. Pt moved to room 213

## 2019-03-01 NOTE — H&P
DATE OF ADMISSION:  2019    ADMITTING DIAGNOSES:  1.  Intrauterine pregnancy at 34-5/7 weeks.  2.  Preeclampsia with severe features based on blood pressures and   proteinuria.    HISTORY OF PRESENT ILLNESS:  This is a 23-year-old  1, para 0 with an   estimated date of confinement of 2019, making her 34-5/7 weeks who   presented to labor and delivery this evening with complaints of worsening   epigastric pain since Tuesday.  When she was evaluated in triage labor   assessment area, her blood pressures were in the 160s/110s.  Her urine had 3+   protein and was very dark and concentrated and her DTRs are 2+ and she has no   clonus.  There is concern for preeclampsia with severe features and the   recommendation is made for admission and move towards delivery.    PRENATAL CARE:  Has been with Dr. Moreno.  Her estimated date of   confinement of 2019 was established by a 6-week ultrasound.    PRENATAL LABORATORY DATA:  Her blood type is A positive, antibody screen   negative, RPR nonreactive, rubella immune, hepatitis B surface antigen   negative, hepatitis C negative, HIV negative.  Her 1-hour Glucola was normal.    Her group B Strep status is negative.  Her first-trimester screen was low   risk and her AFP only was low risk.    COMPLICATIONS WITH THE PREGNANCY:  Include polycystic ovarian syndrome for   which she has been taking metformin.  She had some bleeding early in her   pregnancy and her placenta was low lying, but is now fundal.  Total weight   gain for the pregnancy has been approximately 25 pounds.    ALLERGIES:  She has no known drug allergies.    MEDICATIONS:  Include prenatal vitamins and metformin 1000 mg p.o. b.i.d.    PAST SURGICAL HISTORY:  Negative.    SOCIAL HISTORY:  She denies tobacco, alcohol or IV drug use.    FAMILY HISTORY:  She has a maternal grandmother and maternal aunt with breast   cancer.    GYNECOLOGIC HISTORY:  She has no history of any HSV or abnormal  Paps.    OBSTETRICAL HISTORY:   1 is her current pregnancy.    PHYSICAL EXAMINATION:  VITAL SIGNS:  Blood pressure is 160/107, pulse is 94, she is afebrile.  GENERAL:  She is in moderate distress with epigastric pain and mild   contractions.  LUNGS:  Clear to auscultation bilaterally.  CARDIOVASCULAR:  Regular rate and rhythm.  ABDOMEN:  Gravid and nontender.  EXTREMITIES:  Show trace pedal edema.  DTRs are 2+ with no clonus per the   nurse's exam.  PELVIC:  Fetal heart tones are in the 150s-160s and a category 1 tracing.    Tocometry shows contractions every 2 minutes; however, she is not feeling   them.  Cervical exam per the nurse, she is closed and effaced, vertex   presentation confirmed by ultrasound.  Estimated fetal weight is 2300 g.    LABORATORY STUDIES:  Thus far show +3 protein in her urine.  The rest of her   laboratory studies are pending at this point.    IMPRESSION:  This is a 23-year-old  1, para 0 at 34-5/7 weeks with   preeclampsia with severe features who is in fair condition.    PLAN:  1.  Patient will be admitted to labor and delivery.  2.  Hypertensive protocol and magnesium sulfate will be initiated.  3.  When laboratory studies are returned, she will either be induced with   misoprostol or undergoing a  depending on the severity of her   laboratory studies.  4.  There is currently reassuring fetal surveillance.  5.  She will be given betamethasone for fetal lung maturity, but her delivery   will not be delayed to achieve a second dose of betamethasone if there is   severe preeclampsia.       ____________________________________     MD ETHAN HOLT / TORI    DD:  2019 00:02:40  DT:  2019 02:42:48    D#:  [id number]  Job#:  [id number]    cc: LAINEY BROOKE MD

## 2019-03-01 NOTE — PROGRESS NOTES
2345: Report received from HAI Thakkar RN. POC discussed.Pt transferred to S213.   0155: Decision made for P C/S.  0216: Out of Pre-op, transferred to OR 2 via gurney.  0320: In PACU bed 3 for recovery.  0555: Transferred to S233 for 24-hr mag recovery.   0700: report given to MABEL Piper RN. POC discussed.

## 2019-03-01 NOTE — PROGRESS NOTES
Report received, assessment done. Pt is comfortable and declines any more pain meds. The dressing is clean and intact and there is no bleeding noted. Denies having epigastric pain or any H/A or visual changes.  1400 urine output is greatly improving to around 200 per hour.color of urine has changed to light yellow from deion color. Pt states she feels a lot better.  Pt was taken to NICU by WC, tolerated well, visited with baby.  1730 Dr Moreno in the room, labs reviewed, report given.  1820 order received that pt may go to PP.  1900 Report given to night shift RN.

## 2019-03-01 NOTE — LACTATION NOTE
"Baby in NICU, mother remains on antepartum receiving magnesium sulfate, breast pump provided, educated on proper pump use and settings, dish soap provided and educated on proper technique for cleaning pump parts, mother able to pump comfortably with suction between 14-17%, flanges appear to fit appropriately without rubbing or discomfort    Plan:  Pump at least 8 times every 24 hours  Pump Q 2-3 hours for 15 minutes  Decrease speed from 80-60 after 2 minutes  Set suction for comfort  Encouraged to leave time at night for a 5 hour stretch of sleep    Mother has personal pump at home ([employer] she thinks), encouraged HG rental and rental information provided    \"A New Beginning\" booklet provided     Discussed outpatient assistance available at [facility]    Encouraged to call for assistance as needed      "

## 2019-03-01 NOTE — OP REPORT
DATE OF SERVICE:  2019    PREOPERATIVE DIAGNOSES:  1.  Intrauterine pregnancy at 34-6/7 weeks.  2.  HELLP syndrome.  3.  Remote from delivery.    POSTOPERATIVE DIAGNOSES:  1.  Intrauterine pregnancy at 34-6/7 weeks.  2.  HELLP syndrome.  3.  Remote from delivery.  4.  Nuchal cord x2.    PROCEDURE:  Primary lower uterine segment transverse  section.    SURGEON:  Jo Ann Arellano MD    ASSISTANT:  Darius Spicer MD    ANESTHESIA:  General endotracheal anesthesia.    ANESTHESIOLOGIST:  Milena Cantu MD    ESTIMATED BLOOD LOSS:  700 mL.    FLUIDS:  1200 mL crystalloid.    URINE OUTPUT:  50 mL of concentrated urine.    FINDINGS:  Live born female infant, weight 5 pounds 3 ounces, Apgars 6 and 8,   normal-appearing placenta with 3-vessel cord, normal-appearing uterus, tubes,   and ovaries, clear amniotic fluid, and nuchal cord x2.    DRAINS:  Byrd to gravity.    SPECIMENS:  None.    COMPLICATIONS:  None apparent.    DISPOSITION:  Patient to recovery room in stable condition.    TECHNIQUE:  After adequate informed consent was obtained, the patient was   taken to the operating room.  She was placed on the OR table in the supine   position with the left lateral tilt.  Fetal heart tones were obtained and   found to be in the 140s.  She previously had a Byrd catheter placed to   gravity and sequential stockings placed on her lower extremities.  She   received 2 g of Ancef preoperatively.  She was then prepped and draped in the   usual sterile fashion.  A time-out was taken to confirm the proper patient and   procedure.  The patient was then placed under general endotracheal   anesthesia.  After ensuring adequate anesthesia, Pfannenstiel skin incision   was made and carried down sharply to the fascia.  The fascia was nicked in the   midline and dissected bilaterally using sharp dissection.  The fascia was   then  from the rectus muscle both superiorly and inferiorly using   blunt dissection.  The  peritoneum was identified and entered bluntly after   ensuring no evidence of bowel or bladder underneath.  The peritoneum was   dissected superiorly and inferiorly using sharp dissection.  An Durga O   retractor was placed.  A scalpel was used to create a hysterotomy incision   transversely in the lower uterine segment to the level of the amniotic sac.    The amniotic sac was encountered and ruptured and clear fluid was noted.  The   uterine incision was extended bilaterally using blunt dissection.  The   surgeon's hand was placed in the uterine cavity and the fetal head was   elevated and delivered without complication.  Mouth and nose were bulb   suctioned.  A tight nuchal cord x2 was easily reduced.  The rest of the baby   delivered easily.  There is 30 seconds of delayed cord clamping.  The cord was   then clamped twice and cut.  This was a viable female infant, weight 5 pounds   3 ounces, Apgars 6 and 8, was handed off to the waiting respiratory therapist   and intensive care nursery staff.  The placenta was removed with manual   extraction.  The patient then received 20 units of IV Pitocin and 1000 mL of   lactated Ringers IV.  The uterine cavity was curetted using a dry lap sponge   and there was no evidence of any retained products of conception.  The uterine   incision was identified and closed using a single suture of 0 Vicryl in a   running locked fashion.  Good hemostasis was noted that Durga O retractor was   removed.  There was irrigation of the incision and no active bleeding was   noted.  Seprafilm was placed across the lower uterine segment and the anterior   surface of the uterus.  The peritoneum was identified and closed using a   single suture of 2-0 Vicryl in a running fashion.  The rectus muscles were   reapproximated in the midline using several figure-of-eight sutures.  The   wound was irrigated copiously.  There was some bleeding beneath the fascia.    This was treated with electrocautery  and arrest.  The fascia was identified   and closed using 2 separate sutures of 0-Vicryl in a running fashion   overlapping in the midline.  The subcutaneous tissue was irrigated.  No   bleeding was noted.  This was reapproximated using 3-0 Vicryl in a running   fashion.  The skin was reapproximated using 4-0 Vicryl in a running fashion.    Steri-Strips were placed and dressing was applied.  Sponge and instrument   counts were correct x3.  The patient was extubated and taken to the recovery   room in stable condition and there were no apparent complications.  Her   magnesium sulfate will be continued for 24 hours and repeat CMP and CBC will   be drawn.  Her urine output will be closely monitored.       ____________________________________     MD ETHAN HOLT / NTS    DD:  03/01/2019 03:34:10  DT:  03/01/2019 03:59:52    D#:  [id number]  Job#:  [id number]    cc: LAINEY BROOKE MD

## 2019-03-02 LAB
ALBUMIN SERPL BCP-MCNC: 2.8 G/DL (ref 3.2–4.9)
ALBUMIN/GLOB SERPL: 1.2 G/DL
ALP SERPL-CCNC: 95 U/L (ref 30–99)
ALT SERPL-CCNC: 96 U/L (ref 2–50)
ANION GAP SERPL CALC-SCNC: 10 MMOL/L (ref 0–11.9)
AST SERPL-CCNC: 45 U/L (ref 12–45)
BILIRUB SERPL-MCNC: 0.3 MG/DL (ref 0.1–1.5)
BUN SERPL-MCNC: 13 MG/DL (ref 8–22)
CALCIUM SERPL-MCNC: 6.8 MG/DL (ref 8.5–10.5)
CHLORIDE SERPL-SCNC: 106 MMOL/L (ref 96–112)
CO2 SERPL-SCNC: 21 MMOL/L (ref 20–33)
CREAT SERPL-MCNC: 0.77 MG/DL (ref 0.5–1.4)
ERYTHROCYTE [DISTWIDTH] IN BLOOD BY AUTOMATED COUNT: 47.7 FL (ref 35.9–50)
ERYTHROCYTE [DISTWIDTH] IN BLOOD BY AUTOMATED COUNT: 49.1 FL (ref 35.9–50)
GLOBULIN SER CALC-MCNC: 2.4 G/DL (ref 1.9–3.5)
GLUCOSE SERPL-MCNC: 121 MG/DL (ref 65–99)
HCT VFR BLD AUTO: 27.5 % (ref 37–47)
HCT VFR BLD AUTO: 27.8 % (ref 37–47)
HGB BLD-MCNC: 9.2 G/DL (ref 12–16)
HGB BLD-MCNC: 9.2 G/DL (ref 12–16)
MCH RBC QN AUTO: 32.4 PG (ref 27–33)
MCH RBC QN AUTO: 32.5 PG (ref 27–33)
MCHC RBC AUTO-ENTMCNC: 33.1 G/DL (ref 33.6–35)
MCHC RBC AUTO-ENTMCNC: 33.5 G/DL (ref 33.6–35)
MCV RBC AUTO: 96.8 FL (ref 81.4–97.8)
MCV RBC AUTO: 98.2 FL (ref 81.4–97.8)
PLATELET # BLD AUTO: 90 K/UL (ref 164–446)
PLATELET # BLD AUTO: 93 K/UL (ref 164–446)
PMV BLD AUTO: 12.5 FL (ref 9–12.9)
PMV BLD AUTO: 13.1 FL (ref 9–12.9)
POTASSIUM SERPL-SCNC: 4 MMOL/L (ref 3.6–5.5)
PROT SERPL-MCNC: 5.2 G/DL (ref 6–8.2)
RBC # BLD AUTO: 2.83 M/UL (ref 4.2–5.4)
RBC # BLD AUTO: 2.84 M/UL (ref 4.2–5.4)
SODIUM SERPL-SCNC: 137 MMOL/L (ref 135–145)
WBC # BLD AUTO: 16.6 K/UL (ref 4.8–10.8)
WBC # BLD AUTO: 16.8 K/UL (ref 4.8–10.8)

## 2019-03-02 PROCEDURE — 90686 IIV4 VACC NO PRSV 0.5 ML IM: CPT | Performed by: OBSTETRICS & GYNECOLOGY

## 2019-03-02 PROCEDURE — A6250 SKIN SEAL PROTECT MOISTURIZR: HCPCS | Performed by: OBSTETRICS & GYNECOLOGY

## 2019-03-02 PROCEDURE — 85027 COMPLETE CBC AUTOMATED: CPT

## 2019-03-02 PROCEDURE — 700105 HCHG RX REV CODE 258: Performed by: OBSTETRICS & GYNECOLOGY

## 2019-03-02 PROCEDURE — 700111 HCHG RX REV CODE 636 W/ 250 OVERRIDE (IP): Performed by: OBSTETRICS & GYNECOLOGY

## 2019-03-02 PROCEDURE — A9270 NON-COVERED ITEM OR SERVICE: HCPCS | Performed by: OBSTETRICS & GYNECOLOGY

## 2019-03-02 PROCEDURE — 90471 IMMUNIZATION ADMIN: CPT

## 2019-03-02 PROCEDURE — 3E02340 INTRODUCTION OF INFLUENZA VACCINE INTO MUSCLE, PERCUTANEOUS APPROACH: ICD-10-PCS | Performed by: OBSTETRICS & GYNECOLOGY

## 2019-03-02 PROCEDURE — 700112 HCHG RX REV CODE 229: Performed by: OBSTETRICS & GYNECOLOGY

## 2019-03-02 PROCEDURE — 770002 HCHG ROOM/CARE - OB PRIVATE (112)

## 2019-03-02 PROCEDURE — 700102 HCHG RX REV CODE 250 W/ 637 OVERRIDE(OP): Performed by: OBSTETRICS & GYNECOLOGY

## 2019-03-02 PROCEDURE — 80053 COMPREHEN METABOLIC PANEL: CPT

## 2019-03-02 PROCEDURE — 36415 COLL VENOUS BLD VENIPUNCTURE: CPT

## 2019-03-02 RX ADMIN — Medication 1 TABLET: at 09:56

## 2019-03-02 RX ADMIN — OXYCODONE HYDROCHLORIDE 5 MG: 5 TABLET ORAL at 14:24

## 2019-03-02 RX ADMIN — DOCUSATE SODIUM 100 MG: 100 CAPSULE, LIQUID FILLED ORAL at 22:15

## 2019-03-02 RX ADMIN — DOCUSATE SODIUM 100 MG: 100 CAPSULE, LIQUID FILLED ORAL at 09:55

## 2019-03-02 RX ADMIN — SODIUM CHLORIDE, POTASSIUM CHLORIDE, SODIUM LACTATE AND CALCIUM CHLORIDE 1000 ML: 600; 310; 30; 20 INJECTION, SOLUTION INTRAVENOUS at 01:30

## 2019-03-02 RX ADMIN — INFLUENZA A VIRUS A/MICHIGAN/45/2015 X-275 (H1N1) ANTIGEN (FORMALDEHYDE INACTIVATED), INFLUENZA A VIRUS A/SINGAPORE/INFIMH-16-0019/2016 IVR-186 (H3N2) ANTIGEN (FORMALDEHYDE INACTIVATED), INFLUENZA B VIRUS B/PHUKET/3073/2013 ANTIGEN (FORMALDEHYDE INACTIVATED), AND INFLUENZA B VIRUS B/MARYLAND/15/2016 BX-69A ANTIGEN (FORMALDEHYDE INACTIVATED) 0.5 ML: 15; 15; 15; 15 INJECTION, SUSPENSION INTRAMUSCULAR at 16:48

## 2019-03-02 RX ADMIN — IBUPROFEN 600 MG: 600 TABLET ORAL at 09:56

## 2019-03-02 RX ADMIN — OXYCODONE HYDROCHLORIDE 10 MG: 10 TABLET ORAL at 04:38

## 2019-03-02 RX ADMIN — OXYCODONE HYDROCHLORIDE 10 MG: 10 TABLET ORAL at 00:46

## 2019-03-02 RX ADMIN — Medication 1 TABLET: at 09:55

## 2019-03-02 RX ADMIN — OXYCODONE HYDROCHLORIDE 5 MG: 5 TABLET ORAL at 22:15

## 2019-03-02 RX ADMIN — IBUPROFEN 600 MG: 600 TABLET ORAL at 22:15

## 2019-03-02 NOTE — PROGRESS NOTES
1900-Report received from KAILA Vegas.   2000-Pt transferred to postpartum in stable condition with firm fundus and scant lochia. Report given to KAILA Serrano

## 2019-03-02 NOTE — PROGRESS NOTES
0300 Magnesium Sulfate and LR infusion stopped and discontinued as ordered, Byrd catheter removed.

## 2019-03-02 NOTE — PROGRESS NOTES
2000 Admitted from L and D per wheelchair, Pt oriented to room, Pt educated to call if she needs her nurse, Pt still on Magnesium Sulfate infusion, with dickson catheter connected to urine bag draining to clear urine output, with sequential stocking bilaterally, Assessment done wound dressing clean dry and intact, Pt DTR checked, Admission care rendered.

## 2019-03-02 NOTE — PROGRESS NOTES
15 hrs PP    Awake, alert, normotensive without meds, urine output excellent, liver enzymes going down, platelets drifted down but not dangerously low, Mg++ therapeutic. Tolerating regular diet well.       2/28/2019 23:42 3/1/2019 16:39   WBC 17.0 (H) 17.4 (H)   Hemoglobin 13.1 10.1 (L)   Hematocrit 37.1 28.8 (L)   Platelet Count 61 (L) 53 (L)   Sodium 136 131 (L)   Potassium 3.8 4.2   Chloride 108 105   Co2 18 (L) 20   Glucose 89 131 (H)   Bun 14 10   Creatinine 0.64 0.65   GFR If Non African American >60 >60   Calcium 8.5 7.0 (L)   AST(SGOT) 209 (H) 99 (H)   ALT(SGPT) 168 (H) 129 (H)   Total Bilirubin 1.5 0.6   Magnesium  6.1 (HH)   Uric Acid 6.1         3/1/2019 02:19 3/1/2019 05:21 3/1/2019 11:20 3/1/2019 16:39   Magnesium 4.0 (H) 4.6 (H) 5.3 (HH) 6.1 (HH)       PLAN:  DC MgSO4 24 hrs PP, check labs again at 05:00, postop care.

## 2019-03-02 NOTE — PROGRESS NOTES
0400 wound open to air clean and dry with steri strip to skin, Pt went to NICU via wheelchair by CNA.

## 2019-03-02 NOTE — CARE PLAN
Problem: Potential knowledge deficit related to lack of understanding of self and  care  Goal: Patient will verbalize understanding of self and infant care    Intervention: Assess patient and knowledge of self and infant care  Patient encouraged to ambulate with assistance if needed. Patient educated on prn pain medication per standing orders and continues to take prn medication as needed. Patient encouraged to continue using breast pump ever 2-3 hours to encouraged milk supply. Dr. Mock asking for cmp and cbc this am and orders entered in epic. Patients heplock removed.

## 2019-03-02 NOTE — PROGRESS NOTES
"POD #1 s/p primary LTCS via pfannenstiel skin incision for HELLP syndrome at 34w6d.    S:  Doing well.  She denies headaches, vision changes, and RUQ pain.  She is pumping breast milk.  She does report some nausea.      O: Blood pressure 123/62, pulse 74, temperature 36.3 °C (97.3 °F), temperature source Temporal, resp. rate 18, height 1.651 m (5' 5\"), weight 86.6 kg (191 lb), SpO2 97 %, not currently breastfeeding.    A, A, and O x 3 NAD    FF u/2    Incision - clean/dry/intact - steristrips in place    Recent Labs      02/28/19   2342  03/01/19   1639   WBC  17.0*  17.4*   RBC  4.05*  3.09*   HEMOGLOBIN  13.1  10.1*   HEMATOCRIT  37.1  28.8*   MCV  91.6  93.2   MCH  32.3  32.7   RDW  43.4  45.4   PLATELETCT  61*  53*   MPV  12.9  13.7*   NEUTSPOLYS  85.30*  86.10*   LYMPHOCYTES  7.90*  9.40*   MONOCYTES  5.70  3.50   EOSINOPHILS  0.10  0.00   BASOPHILS  0.20  0.30     A/P:  POD #1 s/p primary LTCS via pfannenstiel skin incision for HELLP syndrome at 34w6d.    1.  Ambulate TID.  2.  ADAT.  3.  Continue cares.  4.  Begin Folitab.  5.  Work on hand expression.  6.  Infant in NICU.  7.  Interdry to incision.  8.  Repeat preeclampsia labs.  "

## 2019-03-03 PROCEDURE — A9270 NON-COVERED ITEM OR SERVICE: HCPCS | Performed by: OBSTETRICS & GYNECOLOGY

## 2019-03-03 PROCEDURE — 700112 HCHG RX REV CODE 229: Performed by: OBSTETRICS & GYNECOLOGY

## 2019-03-03 PROCEDURE — 770002 HCHG ROOM/CARE - OB PRIVATE (112)

## 2019-03-03 PROCEDURE — 700102 HCHG RX REV CODE 250 W/ 637 OVERRIDE(OP): Performed by: OBSTETRICS & GYNECOLOGY

## 2019-03-03 RX ADMIN — OXYCODONE HYDROCHLORIDE 5 MG: 5 TABLET ORAL at 02:25

## 2019-03-03 RX ADMIN — OXYCODONE HYDROCHLORIDE 5 MG: 5 TABLET ORAL at 18:34

## 2019-03-03 RX ADMIN — Medication 1 TABLET: at 10:33

## 2019-03-03 RX ADMIN — IBUPROFEN 600 MG: 600 TABLET ORAL at 10:33

## 2019-03-03 RX ADMIN — OXYCODONE HYDROCHLORIDE 5 MG: 5 TABLET ORAL at 14:40

## 2019-03-03 RX ADMIN — IBUPROFEN 600 MG: 600 TABLET ORAL at 16:35

## 2019-03-03 RX ADMIN — IBUPROFEN 600 MG: 600 TABLET ORAL at 04:25

## 2019-03-03 RX ADMIN — IBUPROFEN 600 MG: 600 TABLET ORAL at 22:47

## 2019-03-03 RX ADMIN — OXYCODONE HYDROCHLORIDE 5 MG: 5 TABLET ORAL at 10:33

## 2019-03-03 RX ADMIN — OXYCODONE HYDROCHLORIDE 5 MG: 5 TABLET ORAL at 06:53

## 2019-03-03 RX ADMIN — DOCUSATE SODIUM 100 MG: 100 CAPSULE, LIQUID FILLED ORAL at 22:48

## 2019-03-03 RX ADMIN — OXYCODONE HYDROCHLORIDE 5 MG: 5 TABLET ORAL at 22:47

## 2019-03-03 RX ADMIN — DOCUSATE SODIUM 100 MG: 100 CAPSULE, LIQUID FILLED ORAL at 10:33

## 2019-03-03 RX ADMIN — Medication 1 TABLET: at 06:53

## 2019-03-03 ASSESSMENT — EDINBURGH POSTNATAL DEPRESSION SCALE (EPDS)
I HAVE FELT SAD OR MISERABLE: NOT VERY OFTEN
I HAVE BEEN SO UNHAPPY THAT I HAVE HAD DIFFICULTY SLEEPING: NOT AT ALL
I HAVE LOOKED FORWARD WITH ENJOYMENT TO THINGS: AS MUCH AS I EVER DID
I HAVE BEEN SO UNHAPPY THAT I HAVE BEEN CRYING: ONLY OCCASIONALLY
THE THOUGHT OF HARMING MYSELF HAS OCCURRED TO ME: NEVER
I HAVE BEEN ABLE TO LAUGH AND SEE THE FUNNY SIDE OF THINGS: AS MUCH AS I ALWAYS COULD
THINGS HAVE BEEN GETTING ON TOP OF ME: NO, MOST OF THE TIME I HAVE COPED QUITE WELL
I HAVE BEEN ANXIOUS OR WORRIED FOR NO GOOD REASON: HARDLY EVER
I HAVE FELT SCARED OR PANICKY FOR NO GOOD REASON: NO, NOT MUCH
I HAVE BLAMED MYSELF UNNECESSARILY WHEN THINGS WENT WRONG: YES, SOME OF THE TIME

## 2019-03-03 NOTE — CARE PLAN
Problem: Altered physiologic condition related to postoperative  delivery  Goal: Patient physiologically stable as evidenced by normal lochia, palpable uterine involution and vital signs within normal limits  Outcome: PROGRESSING AS EXPECTED  Fundus firm. Lochia light to scant.      Problem: Potential for postpartum infection related to surgical incision, compromised uterine condition, urinary tract or respiratory compromise  Goal: Patient will be afebrile and free from signs and symptoms of infection  Outcome: PROGRESSING AS EXPECTED  Patient is afebrile. Incision clean, dry, intact.  No signs and symptoms of infection noted.

## 2019-03-03 NOTE — CARE PLAN
Problem: Alteration in comfort related to surgical incision and/or after birth pains  Goal: Patient is able to ambulate, care for self and infant with acceptable pain level  Outcome: PROGRESSING AS EXPECTED  Will medicate for pain as needed.   Encouraged ambulation.       Problem: Potential anxiety related to difficulty adapting to parental role  Goal: Patient will verbalize and demonstrate effective bonding and parenting behavior  Outcome: PROGRESSING AS EXPECTED  She will visit baby in the NICU.

## 2019-03-03 NOTE — PROGRESS NOTES
POD 2---      Afebrile, VS normal, UO adequate  BP 120s-140s/70s-80s without antihypertensives  + flatus, tolerating regular diet well, denies N/V  Tolerating blood-loss anemia well.    LAB:      Platelets up to 93 yesterday (layton was 53)  Liver enzymes markedly improved yesterday.    Results for CAMMIE BUSTAMANTE (MRN [id number]) as of 3/3/2019 08:21   3/1/2019 16:39 3/2/2019 09:17 3/2/2019 09:17 3/2/2019 09:18   WBC 17.4 (H) 16.8 (H) 16.6 (H)    Hemoglobin 10.1 (L) 9.2 (L) 9.2 (L)    Hematocrit 28.8 (L) 27.8 (L) 27.5 (L)    Platelet Count 53 (L) 90 (L) 93 (L)    Bun 10   13   Creatinine 0.65   0.77   GFR If Non  >60   >60   AST(SGOT) 99 (H)   45   ALT(SGPT) 129 (H)   96 (H)   Total Bilirubin 0.6   0.3   Magnesium 6.1 (HH)          PE:     Lungs clear to auscultation  Abdomen soft, flat, bowel sounds present and normal  Wound clean, dry, intact, no redness  Calves nontender, Sheldon sign negative bilaterally  Back:  No CVA tenderness     PLAN: Folitab 500 daily.  Postop care, analgesia as needed, diet as tolerated,  activity as tolerated. Patient planning on discharge: probably tomorrow .

## 2019-03-03 NOTE — PROGRESS NOTES
Patient received influenza vaccine. Fob received tdap vaccine. Called pharmacy to discuss steroid use for fob with tdap vaccine. Iz administered to fob due to benefits outway risk. Fob aware iz won't be as effective with steroid use.

## 2019-03-03 NOTE — PROGRESS NOTES
Assessment completed. WDL. Patient progressing according to plan of care. Patient up and ambulating. Pt states she is voiding without difficulty. Patient claims to have good pain relief with prn pain medications. Pt states she would like her pain medication when it's due. Pt denies any other issues at this time. Educated pt about pumping every 3 hours. Pt encouraged to call for any needs.

## 2019-03-03 NOTE — PROGRESS NOTES
A bedside report received from [place] BRIGHT.  Assumed care. Discussed plan of care especially on managing pain. Assessment done. Denies pain. Encouraged to call if with needs. Will check at intervals.

## 2019-03-03 NOTE — CONSULTS
Observe mom pumping to assess flange fit. Mom pumping on a suction from 1% to 15% due to nipple sensitivity. Flange fit at this time appears appropriate. No rubbing observed. Mom states she has history of sensitivity that has not increased since pregnancy and delivery. Mom is decreasing speed from 80 to 60 and able to comfortably bump up suction once speed is decreased. Mom is supplementing pumping with hand expression as per [place] video quidelines.    Mom is planning on using a personal [employer] electric pump post discharge. Discussed preference of using hospital rental pump for establishment of milk production. Mom is considering rental from the [facility] tomorrow am.

## 2019-03-04 PROCEDURE — A9270 NON-COVERED ITEM OR SERVICE: HCPCS | Performed by: OBSTETRICS & GYNECOLOGY

## 2019-03-04 PROCEDURE — 700102 HCHG RX REV CODE 250 W/ 637 OVERRIDE(OP): Performed by: OBSTETRICS & GYNECOLOGY

## 2019-03-04 PROCEDURE — 700112 HCHG RX REV CODE 229: Performed by: OBSTETRICS & GYNECOLOGY

## 2019-03-04 PROCEDURE — A6250 SKIN SEAL PROTECT MOISTURIZR: HCPCS | Performed by: OBSTETRICS & GYNECOLOGY

## 2019-03-04 PROCEDURE — 770002 HCHG ROOM/CARE - OB PRIVATE (112)

## 2019-03-04 RX ADMIN — Medication 1 TABLET: at 06:04

## 2019-03-04 RX ADMIN — IBUPROFEN 600 MG: 600 TABLET ORAL at 05:45

## 2019-03-04 RX ADMIN — OXYCODONE HYDROCHLORIDE 5 MG: 5 TABLET ORAL at 03:52

## 2019-03-04 RX ADMIN — IBUPROFEN 600 MG: 600 TABLET ORAL at 22:17

## 2019-03-04 RX ADMIN — OXYCODONE HYDROCHLORIDE 10 MG: 10 TABLET ORAL at 22:19

## 2019-03-04 RX ADMIN — OXYCODONE HYDROCHLORIDE 5 MG: 5 TABLET ORAL at 13:04

## 2019-03-04 RX ADMIN — OXYCODONE HYDROCHLORIDE 5 MG: 5 TABLET ORAL at 17:55

## 2019-03-04 RX ADMIN — DOCUSATE SODIUM 100 MG: 100 CAPSULE, LIQUID FILLED ORAL at 13:04

## 2019-03-04 RX ADMIN — OXYCODONE HYDROCHLORIDE 5 MG: 5 TABLET ORAL at 08:32

## 2019-03-04 RX ADMIN — IBUPROFEN 600 MG: 600 TABLET ORAL at 13:04

## 2019-03-04 NOTE — LACTATION NOTE
This note was copied from a baby's chart.   Day 3, baby born at 34.6, now 35.2 wks gestation. Mom is pumping a total of almost an ounce each time. She will be discharged tomorrow. She has HHP and wants to borrow an [employer] pump for home. Gave her HHP paperwork and encouraged her to get the pump before discharge.  Pump settings: 80 to 60, sx-20-30% for 15 min.    Plan: Continue to pump at least 8-10 times/24 hrs.  Try to do skin to skin with baby in the NICU if possible.  Ask to see lactation when baby is able to try breastfeeding.

## 2019-03-04 NOTE — PROGRESS NOTES
2200 Pt doing well, Assessment done wound open to air clean and dry with steri strip to skin, Pt denies pain at this time, Encourage more ambulation, Needs attended.

## 2019-03-04 NOTE — PROGRESS NOTES
0715- Bedside report received, assumed care of patient.  Pt resting comfortably in bed, denies needs at this time.  0800- Pt up using breast pump, in NAD, denies needs.  0830- Pt assessment complete, wnl.  Pt ambulating and voiding without difficulty, states positive flatus.  Bonding well with infant, visits in NICU.  Plan of care discussed with patient for the day.  Questions answered.  Encouraged to call with needs.

## 2019-03-04 NOTE — CARE PLAN
Problem: Altered physiologic condition related to postoperative  delivery  Goal: Patient physiologically stable as evidenced by normal lochia, palpable uterine involution and vital signs within normal limits  Outcome: PROGRESSING AS EXPECTED  Fundus firm, lochia light, VSS.    Problem: Alteration in comfort related to surgical incision and/or after birth pains  Goal: Patient verbalizes acceptable pain level  Outcome: PROGRESSING AS EXPECTED  Pt verbalizes acceptable pain level, taking po pain medication as needed.

## 2019-03-04 NOTE — PROGRESS NOTES
"POD #3 s/p primary LTCS at 34w6d for HELLP syndrome.    S: Doing well.  Working on breast feeding/pumping/hand expression.   Baby is in the NICU and she would like to stay until tomorrow.  Moderate lochia.  She is ambulating, voiding spontaneously, and tolerating a regular diet.  She denies fevers/chills/night sweats.  She states her pain is well controlled.    O:  Blood pressure 137/86, pulse 60, temperature 36.3 °C (97.4 °F), temperature source Temporal, resp. rate 18, height 1.651 m (5' 5\"), weight 86.6 kg (191 lb), SpO2 97 %, currently breastfeeding.    A, A, and O x 3 NAD    Incision: clean/dry/intact.  Steri strips in place.    No c/c/e    Recent Labs      03/01/19   1639  03/02/19   0917   WBC  17.4*  16.6*  16.8*   RBC  3.09*  2.84*  2.83*   HEMOGLOBIN  10.1*  9.2*  9.2*   HEMATOCRIT  28.8*  27.5*  27.8*   MCV  93.2  96.8  98.2*   MCH  32.7  32.4  32.5   RDW  45.4  47.7  49.1   PLATELETCT  53*  93*  90*   MPV  13.7*  13.1*  12.5   NEUTSPOLYS  86.10*   --    LYMPHOCYTES  9.40*   --    MONOCYTES  3.50   --    EOSINOPHILS  0.00   --    BASOPHILS  0.30   --      A/P: POD #3 s/p primary LTCS via pfannenstiel skin incision for HELLP syndrome.    1.  Continue cares.  2.  Interdry to incision.  3.  Continue Folitab.  4.  Ambulate TID.  5.  D/C tomorrow morning.  "

## 2019-03-05 VITALS
BODY MASS INDEX: 31.82 KG/M2 | RESPIRATION RATE: 16 BRPM | HEIGHT: 65 IN | WEIGHT: 191 LBS | SYSTOLIC BLOOD PRESSURE: 123 MMHG | HEART RATE: 63 BPM | DIASTOLIC BLOOD PRESSURE: 77 MMHG | OXYGEN SATURATION: 97 % | TEMPERATURE: 97.8 F

## 2019-03-05 LAB
ALBUMIN SERPL BCP-MCNC: 3.2 G/DL (ref 3.2–4.9)
ALBUMIN/GLOB SERPL: 1.2 G/DL
ALP SERPL-CCNC: 111 U/L (ref 30–99)
ALT SERPL-CCNC: 46 U/L (ref 2–50)
ANION GAP SERPL CALC-SCNC: 10 MMOL/L (ref 0–11.9)
AST SERPL-CCNC: 19 U/L (ref 12–45)
BASOPHILS # BLD AUTO: 0.5 % (ref 0–1.8)
BASOPHILS # BLD: 0.05 K/UL (ref 0–0.12)
BILIRUB SERPL-MCNC: 0.4 MG/DL (ref 0.1–1.5)
BUN SERPL-MCNC: 13 MG/DL (ref 8–22)
CALCIUM SERPL-MCNC: 9.1 MG/DL (ref 8.5–10.5)
CHLORIDE SERPL-SCNC: 107 MMOL/L (ref 96–112)
CO2 SERPL-SCNC: 24 MMOL/L (ref 20–33)
CREAT SERPL-MCNC: 0.67 MG/DL (ref 0.5–1.4)
EOSINOPHIL # BLD AUTO: 0.33 K/UL (ref 0–0.51)
EOSINOPHIL NFR BLD: 3.3 % (ref 0–6.9)
ERYTHROCYTE [DISTWIDTH] IN BLOOD BY AUTOMATED COUNT: 50.9 FL (ref 35.9–50)
GLOBULIN SER CALC-MCNC: 2.7 G/DL (ref 1.9–3.5)
GLUCOSE SERPL-MCNC: 95 MG/DL (ref 65–99)
HCT VFR BLD AUTO: 33.1 % (ref 37–47)
HGB BLD-MCNC: 10.8 G/DL (ref 12–16)
IMM GRANULOCYTES # BLD AUTO: 0.09 K/UL (ref 0–0.11)
IMM GRANULOCYTES NFR BLD AUTO: 0.9 % (ref 0–0.9)
LYMPHOCYTES # BLD AUTO: 2.62 K/UL (ref 1–4.8)
LYMPHOCYTES NFR BLD: 26.2 % (ref 22–41)
MCH RBC QN AUTO: 32.2 PG (ref 27–33)
MCHC RBC AUTO-ENTMCNC: 32.6 G/DL (ref 33.6–35)
MCV RBC AUTO: 98.8 FL (ref 81.4–97.8)
MONOCYTES # BLD AUTO: 0.54 K/UL (ref 0–0.85)
MONOCYTES NFR BLD AUTO: 5.4 % (ref 0–13.4)
NEUTROPHILS # BLD AUTO: 6.36 K/UL (ref 2–7.15)
NEUTROPHILS NFR BLD: 63.7 % (ref 44–72)
NRBC # BLD AUTO: 0.02 K/UL
NRBC BLD-RTO: 0.2 /100 WBC
PLATELET # BLD AUTO: 366 K/UL (ref 164–446)
PMV BLD AUTO: 10.2 FL (ref 9–12.9)
POTASSIUM SERPL-SCNC: 4.3 MMOL/L (ref 3.6–5.5)
PROT SERPL-MCNC: 5.9 G/DL (ref 6–8.2)
RBC # BLD AUTO: 3.35 M/UL (ref 4.2–5.4)
SODIUM SERPL-SCNC: 141 MMOL/L (ref 135–145)
WBC # BLD AUTO: 10 K/UL (ref 4.8–10.8)

## 2019-03-05 PROCEDURE — 36415 COLL VENOUS BLD VENIPUNCTURE: CPT

## 2019-03-05 PROCEDURE — 700102 HCHG RX REV CODE 250 W/ 637 OVERRIDE(OP): Performed by: OBSTETRICS & GYNECOLOGY

## 2019-03-05 PROCEDURE — 85025 COMPLETE CBC W/AUTO DIFF WBC: CPT

## 2019-03-05 PROCEDURE — A9270 NON-COVERED ITEM OR SERVICE: HCPCS | Performed by: OBSTETRICS & GYNECOLOGY

## 2019-03-05 PROCEDURE — 80053 COMPREHEN METABOLIC PANEL: CPT

## 2019-03-05 PROCEDURE — 700112 HCHG RX REV CODE 229: Performed by: OBSTETRICS & GYNECOLOGY

## 2019-03-05 RX ORDER — OXYCODONE HYDROCHLORIDE 5 MG/1
5 TABLET ORAL EVERY 4 HOURS PRN
Qty: 30 TAB | Refills: 0 | Status: SHIPPED | OUTPATIENT
Start: 2019-03-05 | End: 2019-03-12

## 2019-03-05 RX ORDER — IBUPROFEN 600 MG/1
600 TABLET ORAL EVERY 6 HOURS PRN
Qty: 60 TAB | Refills: 1 | Status: SHIPPED | OUTPATIENT
Start: 2019-03-05 | End: 2020-06-02

## 2019-03-05 RX ORDER — PSEUDOEPHEDRINE HCL 30 MG
100 TABLET ORAL 2 TIMES DAILY PRN
Qty: 60 CAP | Refills: 1 | Status: SHIPPED | OUTPATIENT
Start: 2019-03-05 | End: 2020-04-30

## 2019-03-05 RX ADMIN — DOCUSATE SODIUM 100 MG: 100 CAPSULE, LIQUID FILLED ORAL at 10:12

## 2019-03-05 RX ADMIN — IBUPROFEN 600 MG: 600 TABLET ORAL at 04:05

## 2019-03-05 RX ADMIN — IBUPROFEN 600 MG: 600 TABLET ORAL at 10:12

## 2019-03-05 RX ADMIN — OXYCODONE HYDROCHLORIDE 5 MG: 5 TABLET ORAL at 04:05

## 2019-03-05 RX ADMIN — Medication 1 TABLET: at 05:51

## 2019-03-05 NOTE — DISCHARGE INSTRUCTIONS
POSTPARTUM DISCHARGE INSTRUCTIONS FOR MOM    YOB: 1995   Age: 23 y.o.               Admit Date: 2019     Discharge Date: 3/5/2019  Attending Doctor:  Giancarlo Moreno M.D.                  Allergies:  Patient has no known allergies.    Discharged to home by car. Discharged via wheelchair, hospital escort: Yes.  Special equipment needed: Not Applicable  Belongings with: Personal  Be sure to schedule a follow-up appointment with your primary care doctor or any specialists as instructed.     Discharge Plan:   Diet Plan: Discussed  Activity Level: Discussed  Confirmed Follow up Appointment: Patient to Call and Schedule Appointment  Medication Reconciliation Updated: Yes  Influenza Vaccine Indication: Indicated: 9 to 64 years of age  Influenza Vaccine Given - only chart on this line when given: Influenza Vaccine Given (See MAR)    REASONS TO CALL YOUR OBSTETRICIAN:  1.   Persistent fever or shaking chills (Temperature higher than 100.4)  2.   Heavy bleeding (soaking more than 1 pad per hour); Passing clots  3.   Foul odor from vagina  4.   Mastitis (Breast infection; breast pain, chills, fever, redness)  5.   Urinary pain, burning or frequency  6.   Episiotomy infection  7.   Abdominal incision infection  8.   Severe depression longer than 24 hours    HAND WASHING  · Prior to handling the baby.  · Before breastfeeding or bottle feeding baby.  · After using the bathroom or changing the baby's diaper.    WOUND CARE  Ask your physician for additional care instructions.  In general:    ·  Incision:      · Keep clean and dry.    · Do NOT lift anything heavier than your baby for up to 6 weeks.    · There should not be any opening or pus.      VAGINAL CARE  · Nothing inside vagina for 6 weeks: no sexual intercourse, tampons or douching.  · Bleeding may continue for 2-4 weeks.  Amount may vary.    · Call your physician for heavy bleeding which means soaking more than 1 pad per hour    BIRTH  "CONTROL  · It is possible to become pregnant at any time after delivery and while breastfeeding.  · Plan to discuss a method of birth control with your physician at your follow up visit. visit.    DIET AND ELIMINATION  · Eating more fiber (bran cereal, fruits, and vegetables) and drinking plenty of fluids will help to avoid constipation.  · Urinary frequency after childbirth is normal.    POSTPARTUM BLUES  During the first few days after birth, you may experience a sense of the \"blues\" which may include impatience, irritability or even crying.  These feeling come and go quickly.  However, as many as 1 in 10 women experience emotional symptoms known as postpartum depression.    Postpartum depression:  May start as early as the second or third day after delivery or take several weeks or months to develop.  Symptoms of \"blues\" are present, but are more intense:  Crying spells; loss of appetite; feelings of hopelessness or loss of control; fear of touching the baby; over concern or no concern at all about the baby; little or no concern about your own appearance/caring for yourself; and/or inability to sleep or excessive sleeping.  Contact your physician if you are experiencing any of these symptoms.    Crisis Hotline:  · [place] Crisis Hotline:  7-361-MKETLCQ  Or 1-615.827.1165  · [place] Crisis Hotline:  1-392.431.3581  Or 444-625-0909    PREVENTING SHAKEN BABY:  If you are angry or stressed, PUT THE BABY IN THE CRIB, step away, take some deep breaths, and wait until you are calm to care for the baby.  DO NOT SHAKE THE BABY.  You are not alone, call a supporter for help.    · Crisis Call Center 24/7 crisis line 752-105-2515 or 1-477.702.6260  · You can also text them, text \"ANSWER\" to 855615    QUIT SMOKING/TOBACCO USE:  I understand the use of any tobacco products increases my chance of suffering from future heart disease and could cause other illnesses which may shorten my life. Quitting the use of tobacco products " is the single most important thing I can do to improve my health. For further information on smoking / tobacco cessation call a Toll Free Quit Line at 1-532.604.4938 (*[employer] [place]) or 1-494.941.6635 (American Lung Association) or you can access the web based program at www.lungusa.org.    · [place] Tobacco Users Help Line:  (378) 620-1724       Toll Free: 1-249.926.7891  · Quit Tobacco Program [facility] (248)046-2513    DEPRESSION / SUICIDE RISK:  As you are discharged from this [facility], it is important to learn how to keep safe from harming yourself.    Recognize the warning signs:  · Abrupt changes in personality, positive or negative- including increase in energy   · Giving away possessions  · Change in eating patterns- significant weight changes-  positive or negative  · Change in sleeping patterns- unable to sleep or sleeping all the time   · Unwillingness or inability to communicate  · Depression  · Unusual sadness, discouragement and loneliness  · Talk of wanting to die  · Neglect of personal appearance   · Rebelliousness- reckless behavior  · Withdrawal from people/activities they love  · Confusion- inability to concentrate     If you or a loved one observes any of these behaviors or has concerns about self-harm, here's what you can do:  · Talk about it- your feelings and reasons for harming yourself  · Remove any means that you might use to hurt yourself (examples: pills, rope, extension cords, firearm)  · Get professional help from the community (Mental Health, Substance Abuse, psychological counseling)  · Do not be alone:Call your Safe Contact- someone whom you trust who will be there for you.  · Call your local CRISIS HOTLINE 790-0379 or 380-727-8356  · Call your local Children's Mobile Crisis Response Team [place] (959) 629-6687 or www.Owlient  · Call the toll free National Suicide Prevention Hotlines   · National Suicide Prevention  Lifeline 644-273-YSQT (5449)  · [facility] 800-SUICIDE (240-1693)    DISCHARGE SURVEY:  Thank you for choosing [facility].  We hope we provided you with very good care.  You may be receiving a survey in the mail.  Please fill it out.  Your opinion is valuable to us.    ADDITIONAL EDUCATIONAL MATERIALS GIVEN TO PATIENT:        My signature on this form indicates that:  1.  I have reviewed and understand the above information  2.  My questions regarding this information have been answered to my satisfaction.  3.  I have formulated a plan with my discharge nurse to obtain my prescribed medication for home.

## 2019-03-05 NOTE — PROGRESS NOTES
Post partum teaching complete.  Patient claims all questions have been answered. Denies problems. Follow up instructions given.

## 2019-03-05 NOTE — CARE PLAN
Problem: Potential for postpartum infection related to surgical incision, compromised uterine condition, urinary tract or respiratory compromise  Goal: Patient will be afebrile and free from signs and symptoms of infection  Outcome: PROGRESSING AS EXPECTED  Incision clean and dry    Problem: Alteration in comfort related to surgical incision and/or after birth pains  Goal: Patient is able to ambulate, care for self and infant with acceptable pain level  Outcome: PROGRESSING AS EXPECTED  Pain medicine given as requested

## 2019-03-05 NOTE — PROGRESS NOTES
Assessment done. Vital signs stable. Patient voiding without difficulty, claims to be passing flatus. Fundus firm at umbilicus with light lochia. Steri strips over low abdominal incision clean, dry, and intact. No redness, swelling, or drainage noted. Skin well approximated. Patient ambulating with steady gait. Patient claims to have good pain relief with p.o meds. Pumping for infant in NICU. POC discussed. Pt claims she will call for any needs or when medications are needed

## 2019-03-05 NOTE — PROGRESS NOTES
Progress Note    Chelle Oksana Noemymarni   Post-op day 4  Chief Admitting Dx: Pregnancy  Labor and delivery, indication for care  Delivery Type:  for HELLP syndrome    Subjective:  Pt is eating a regular diet. Pt is pumping breast milk for baby in ICN. Pt without PIH sx.   Ambulating: yes  Tolerating oral feed: yes  Flatus: yes    Voiding: yes  Dizziness: no  Vitals:    19 1830 19 2200 19 0200 19 0600   BP: 130/83 140/91 128/79 123/77   Pulse:  86 65 63   Resp:  16 16 16   Temp:  37 °C (98.6 °F) 36.7 °C (98.1 °F) 36.6 °C (97.8 °F)   TempSrc:  Temporal Temporal Temporal   SpO2:  95% 97% 97%   Weight:       Height:           Exam:  Gen: NAD  Abdomen: Abdomen soft, non-tender. BS normal. No masses,  No organomegaly  Incision: dry and intact  Fundus Tenderness: no  Below umbilicus: yes  Perineum: perineum intact  Extremities: 1+ edema  Lochia: mild    Labs:   Recent Results (from the past 24 hour(s))   Comp Metabolic Panel    Collection Time: 19  5:15 AM   Result Value Ref Range    Sodium 141 135 - 145 mmol/L    Potassium 4.3 3.6 - 5.5 mmol/L    Chloride 107 96 - 112 mmol/L    Co2 24 20 - 33 mmol/L    Anion Gap 10.0 0.0 - 11.9    Glucose 95 65 - 99 mg/dL    Bun 13 8 - 22 mg/dL    Creatinine 0.67 0.50 - 1.40 mg/dL    Calcium 9.1 8.5 - 10.5 mg/dL    AST(SGOT) 19 12 - 45 U/L    ALT(SGPT) 46 2 - 50 U/L    Alkaline Phosphatase 111 (H) 30 - 99 U/L    Total Bilirubin 0.4 0.1 - 1.5 mg/dL    Albumin 3.2 3.2 - 4.9 g/dL    Total Protein 5.9 (L) 6.0 - 8.2 g/dL    Globulin 2.7 1.9 - 3.5 g/dL    A-G Ratio 1.2 g/dL   ESTIMATED GFR    Collection Time: 19  5:15 AM   Result Value Ref Range    GFR If African American >60 >60 mL/min/1.73 m 2    GFR If Non African American >60 >60 mL/min/1.73 m 2     Recent Labs      19   0917   WBC  16.6*  16.8*   RBC  2.84*  2.83*   HEMOGLOBIN  9.2*  9.2*   HEMATOCRIT  27.5*  27.8*   MCV  96.8  98.2*   MCH  32.4  32.5   RDW  47.7  49.1   PLATELETCT  93*   90*   MPV  13.1*  12.5       Assessment/Plan:  1-Post op-Pt stable will d/c home today and f/u in 2 and 6 weeks.  2-HELLP syndrome- pt with stable bp's without antihypertensives. Pt's LFT's are back to normal, pending cbc.   3-Anemia- asymptomatic, pt to continue with ferrous sulfate.     Rebecca Manley M.D.

## 2019-03-05 NOTE — PROGRESS NOTES
[id number] Pt doing well, Assessment done wound open to air clean and dry with steri strip to skin, Pt complained of mild to moderate abdominal pain medicated her as per doctor orders, Ambulated well needs attended.

## 2019-03-27 NOTE — DISCHARGE SUMMARY
DATE OF ADMISSION:  2019    DATE OF DISCHARGE:  2019    ADMISSION DIAGNOSES:  1.  A 34-5/7 week gestation.  2.  Preeclampsia with severe features.    DISCHARGE DIAGNOSES:  1.  A 34-5/7 week gestation, delivered.  2.  Preeclampsia with severe features (HELLP syndrome).  3.  Anemia secondary to blood loss.  4.  Double nuchal cord.    PROCEDURE:  Primary low transverse  section.    COMPLICATIONS:  None.    TRANSFUSIONS:  None.    BABY:  Female, weight 5 pounds 3 ounces.  One-minute Apgar 6, five-minute   Apgar 8.    PRESCRIPTIONS:  1.  Prenatal vitamin daily.  2.  Folitab 500 one every day.  3.  Ibuprofen 600 mg every 6 hours p.r.n. pain.  4.  Oxycodone immediate release 5 mg every 4 hours as needed for severe pain,   30 with no refills.  5.  Docusate 100 mg 2 times a day as needed for constipation.  6.  Metformin 1000 mg twice a day with meals.    FOLLOWUP PLANS:  The patient saw me 2 weeks after discharge.    HOSPITAL COURSE:  This 23-year-old lady is now G1, P1.  She was cared for by   my colleagues while I was unavailable.  She was admitted at 34-5/7 weeks with   epigastric pain and markedly elevated blood pressure, as well as proteinuria.    She was found to have evidence of HELLP syndrome with a platelet count of   61,000, AST of 209 and ALT of 168.  Her BUN was 14, creatinine 0.64, uric acid   level was 6.1.  Because of her preeclampsia with severe features and   unfavorable cervix, my colleagues proceeded with expeditious primary    section, with general anesthesia.  Postoperatively, hemoglobin and hematocrit   were 9.2 and 27.5, her platelet count bottomed out at 53,000, and was all the   way up to 366,000 on the day of discharge.  Her hemoglobin and hematocrit nedra   to 10.8 and 33.1 on the day of discharge.  Her liver enzymes normalized   postpartum, with an AST of 19 and ALT of 46 on the day of discharge.  She   recovered uneventfully.  There was no evidence of wound infection.   She had   normal return of bowel function.  She was normotensive prior to discharge   without any antihypertensive medication.       ____________________________________     MD HOLLY JOHN / TORI    DD:  03/27/2019 07:09:19  DT:  03/27/2019 07:29:06    D#:  [id number]  Job#:  [id number]

## 2019-04-08 ENCOUNTER — HOSPITAL ENCOUNTER (OUTPATIENT)
Dept: LACTATION | Facility: MEDICAL CENTER | Age: 24
End: 2019-04-08

## 2019-04-08 NOTE — LACTATION NOTE
03/01/2019: 5# 3.4oz  03/29/2019: 6# 5oz  04/08/2019: 7#8.8oz    CC: Latch, breast refusal and supply     History: 34.6 gestation to NICU for respiratory and age. Tx for hyper bili, discharged at 13 days with breastmilk and preemie enfamil.  Mom had initiated breast pumping soon after deliver and has hospital grade pump at home.  Supply has always been low, as low as 6oz after baby home and mom increased it with power pumping and diligence.  Currently at 11oz. Mom has a history of  PCOS with insulin resistance and on metformin through the pregnancy.  Has been bringing baby to breast with the nipple shield every day, sometimes trying to remove it but Avri falls off.      Assessment: Healthy 40week gestation baby, growing well, increasing 19oz in 10 days.  Tongue 2/3 white, extends tongue beyond the lip and lifts to the roof of the mouth.  Moms nipples everted, 18mm and pink.  No damage.  To bare breast but Avri unable to sustain a latch. Moms 24mm NS applied and latched easily and removed 1oz from the left, an additional 0.5oz from the right in 20 minutes total. Diaper changed, skin intack and not red.  Pumped less than an ounce total indicating infant is effective at the breast.  Avri took 30ml from an Samreen bottle, having initial difficulty with finding the bottle nipple in her mouth then competently swallowed. Content to car seat, pacifier offered    Plan:  Supply: Metformin may be appropriate with insulin resistance to increase supply  Power pump 1x per 24 hours for 2 days once a week  Continue pumping and hand expression as this maximizes supply  Baby to Breast: Effectively removed milk today, offer breast 5x/24 hours excluding night time routine. If consistently pumping 15ml total after breast, start skipping every other pumping allowing for the milk to be there for the next feed or pump.  Follow up in 7-10 days to assess weight gain and plan

## 2019-04-19 ENCOUNTER — HOSPITAL ENCOUNTER (OUTPATIENT)
Dept: LACTATION | Facility: MEDICAL CENTER | Age: 24
End: 2019-04-19

## 2019-04-19 NOTE — LACTATION NOTE
2019: 5# 3.4oz  2019: 6# 5oz  2019: 7#8.8oz  2019: 8#4.6oz    CC: Follow up from 19, infant more gassy/spitty with formula    History since 19:  Nighttime feedings pump and bottle, simultaneously.  Daytime to bare breast at times and nipple shield most of the time.  Tops off with prior pumped milk after breastfeeding and pumps anywhere from 1.25-2oz depending on time of day and how well breastfeeding went.  Back on metformin and seen milk increase to 16oz/day from 11oz plus the milk she is nursing that can't be measured.    Assessment: Healthy appearing, alert 6 week old girl, content in car seat. Mom places her bare breast well but nipple coming out creased, she did remove 12ml with initial let down, from 24ml available.  On nipple shield but did not have any sustained nutritive feedings.  0ml intake.  To left breast with nipple shield and pretty sleepy, removed 3ml of the 16ml available, but not responding to stimulation to suck.  Followed up with a bottle easily, burped, stooled, changed.  Arching off/on pacifier, easy to settle but uncomfortable on all breastmilk digesting.  To car seat, pacified, content.    Plan:  Sustainable plan at this time with triple feeding, double feeding at night.    Make a decision within 20 minutes of breastfeeding if she working well or not, sustaining and removing milk.  If working well, may stay on both breasts, otherwise, bottle and pump and keep upright.  May experiment with formula samples at home but discomfort with formula probably due to normal  digestion and perhaps being placed in the crib sooner than what is done in the day.  Wear baby  Follow up in 1-2 weeks to re-evaluate plan

## 2019-05-02 ENCOUNTER — HOSPITAL ENCOUNTER (OUTPATIENT)
Dept: LAB | Facility: MEDICAL CENTER | Age: 24
End: 2019-05-02
Attending: PHYSICIAN ASSISTANT
Payer: COMMERCIAL

## 2019-05-02 PROCEDURE — 87491 CHLMYD TRACH DNA AMP PROBE: CPT

## 2019-05-02 PROCEDURE — 87591 N.GONORRHOEAE DNA AMP PROB: CPT

## 2019-05-05 LAB
C TRACH DNA SPEC QL NAA+PROBE: NEGATIVE
N GONORRHOEA DNA SPEC QL NAA+PROBE: NEGATIVE
SPECIMEN SOURCE: NORMAL

## 2019-05-15 ENCOUNTER — HOSPITAL ENCOUNTER (OUTPATIENT)
Dept: LACTATION | Facility: MEDICAL CENTER | Age: 24
End: 2019-05-15

## 2019-05-15 NOTE — LACTATION NOTE
03/01/2019: 5# 3.4oz  03/29/2019: 6# 5oz  04/08/2019: 7#8.8oz  04/19/2019: 8#4.6oz  05/15/2019: 10#1.2oz    CC: NS use, milk supply, pump    History since 4/19/19: Continues with one formula bottle per night per preemie recommendations at discharge. Has pumped milk in the freezer. Using NS and suspecting baby removes about 1.5oz of available milk so pumping at the end of the feedings.  Has returned to part time work at home without difficulty. Baby awakening 2-3 times at night.  Assessment. Healthy appearing mom and baby.  Appropriate gain since last visit.  To bare breast easily, mom very  patiently latches appropriately tucking Avri in deeply. She removed 6/10 of an ounce then used NS for additional 3/10 of an ounce.  To left side and took 5/10 of an ounce with nipple shield and content.     Plan:  Continue with HG pump to maintain supply as pumping is still necessary until Avri can remove a full feeding. Anticipated over the next 2 months.  Offer bare breast to facilitate learning as she is growing  but do not push the bare breast at this time.  The NS works too.  Discuss formula discontinuation with ped.   Follow up as needed.

## 2020-04-30 ENCOUNTER — OFFICE VISIT (OUTPATIENT)
Dept: MEDICAL GROUP | Facility: PHYSICIAN GROUP | Age: 25
End: 2020-04-30
Payer: COMMERCIAL

## 2020-04-30 VITALS
HEART RATE: 89 BPM | WEIGHT: 147 LBS | BODY MASS INDEX: 23.63 KG/M2 | HEIGHT: 66 IN | OXYGEN SATURATION: 97 % | SYSTOLIC BLOOD PRESSURE: 112 MMHG | TEMPERATURE: 98.5 F | DIASTOLIC BLOOD PRESSURE: 60 MMHG

## 2020-04-30 DIAGNOSIS — Z13.220 LIPID SCREENING: ICD-10-CM

## 2020-04-30 DIAGNOSIS — D64.9 ANEMIA, UNSPECIFIED TYPE: ICD-10-CM

## 2020-04-30 DIAGNOSIS — R20.2 TINGLING IN EXTREMITIES: ICD-10-CM

## 2020-04-30 DIAGNOSIS — Z87.42 HISTORY OF PCOS: ICD-10-CM

## 2020-04-30 DIAGNOSIS — Z13.1 DIABETES MELLITUS SCREENING: ICD-10-CM

## 2020-04-30 DIAGNOSIS — J30.2 CHRONIC SEASONAL ALLERGIC RHINITIS: ICD-10-CM

## 2020-04-30 DIAGNOSIS — Z00.00 HEALTH CARE MAINTENANCE: ICD-10-CM

## 2020-04-30 DIAGNOSIS — Z87.59 HISTORY OF HEMOLYSIS, ELEVATED LIVER ENZYMES, AND LOW PLATELET (HELLP) SYNDROME: ICD-10-CM

## 2020-04-30 PROBLEM — N92.6 IRREGULAR MENSTRUAL CYCLE: Status: RESOLVED | Noted: 2018-04-20 | Resolved: 2020-04-30

## 2020-04-30 PROBLEM — Z31.69 PRE-CONCEPTION COUNSELING: Status: RESOLVED | Noted: 2018-04-20 | Resolved: 2020-04-30

## 2020-04-30 PROCEDURE — 99204 OFFICE O/P NEW MOD 45 MIN: CPT | Performed by: INTERNAL MEDICINE

## 2020-04-30 ASSESSMENT — FIBROSIS 4 INDEX: FIB4 SCORE: 0.21

## 2020-05-01 NOTE — PROGRESS NOTES
New Patient to establish    Chief Complaint   Patient presents with   • Establish Care     numbness on thighs on both side   • Referral Needed     neuro       Subjective:     History of Present Illness: Patient is a 25 y.o. female who is here today to establish primary care    1. Anemia, unspecified type  2. History of hemolysis, elevated liver enzymes, and low platelet (HELLP) syndrome  -Patient had above problem when she was pregnant in 2019  - Patient have not had blood work after that, denied having symptoms besides tingling of extremities  -Patient was concerned about help syndrome and future complication/propensity for prediabetes/diabetes as well as hypertension and metabolic syndrome    3. History of PCOS  Ultrasound pelvis 2018:  Uniform peripheral small right ovarian cysts could support a clinical diagnosis of polycystic ovarian syndrome   -Mention had facial hair before  -However patient had pregnancy after that and no concern for PCOS currently    4. Chronic seasonal allergic rhinitis  -Well-controlled on antihistamine      8. Tingling in extremities  -Mention since  section after last delivery, has tingling and numbness at both inner aspect of thighs  - Sometimes daily numbness of the hands, including thumbs as well as legs  - Patient denied having back pain, neck pain, recent trauma or injuries  - Patient is wondering about the tingling above and would like to be referred to neurology  -Patient would like to rule out diabetes or other etiologies      Current Outpatient Medications on File Prior to Visit   Medication Sig Dispense Refill   • ibuprofen (MOTRIN) 600 MG Tab Take 1 Tab by mouth every 6 hours as needed (For cramping after delivery; do not give if patient is receiving ketorolac (Toradol)). 60 Tab 1   • PRENATAL 6.75-0.2 MG Tab Take 6 Tabs by mouth every day. 60 Tab 2     No current facility-administered medications on file prior to visit.      No Known Allergies  Patient  Active Problem List    Diagnosis Date Noted   • Anemia 04/30/2020   • History of hemolysis, elevated liver enzymes, and low platelet (HELLP) syndrome 04/30/2020   • History of PCOS 04/30/2020   • Tingling in extremities 04/30/2020   • Chronic seasonal allergic rhinitis 04/20/2018     Past Medical History:   Diagnosis Date   • Allergy    • Irregular periods    • Irregular periods 4/20/2018     IMO load March 2020   • Pre-conception counseling 4/20/2018   • Seasonal allergies      Past Surgical History:   Procedure Laterality Date   • PRIMARY C SECTION N/A 3/1/2019    Procedure: PRIMARY C SECTION;  Surgeon: Jo Ann Arellano M.D.;  Location: [facility];  Service: Labor and Delivery     Family History   Problem Relation Age of Onset   • No Known Problems Mother    • Cancer Father         Skin    • Other Sister         fatty liver, PCOS   • No Known Problems Brother    • Cancer Maternal Grandmother         Breast    • Dementia Maternal Grandfather    • No Known Problems Paternal Grandmother    • Heart Disease Paternal Grandfather    • Heart Attack Paternal Grandfather    • No Known Problems Sister    • No Known Problems Sister    • No Known Problems Sister    • No Known Problems Sister    • No Known Problems Brother    • No Known Problems Brother      Social History     Tobacco Use   • Smoking status: Never Smoker   • Smokeless tobacco: Never Used   Substance Use Topics   • Alcohol use: No   • Drug use: No       ROS:     - Constitutional: Negative for fever, chills,    - Eye: Negative for blurry vision    -ENT: Negative for ear pain    - Respiratory: Negative for cough, hemoptysis    - Cardiovascular: Negative for chest pain     - Gastrointestinal: Negative for abdominal pain    - Genitourinary: Negative for dysuria    - Musculoskeletal: Negative for joint swelling    - Skin: Negative for itching    - Neurological: Negative for focal weakness     - Psychiatric/Behavioral: Negative for depression        Physical  "Exam:     /60 (BP Location: Left arm, Patient Position: Sitting, BP Cuff Size: Adult)   Pulse 89   Temp 36.9 °C (98.5 °F) (Temporal)   Ht 1.68 m (5' 6.14\")   Wt 66.7 kg (147 lb)   SpO2 97%   BMI 23.62 kg/m²   General: Normal appearing. No distress.  ENT: oropharynx without exudates.    Eyes: conjunctiva clear lids without ptosis  Pulmonary: Clear to ausculation.  Normal effort.   Cardiovascular: Regular rate and rhythm  Abdomen: Soft, nontender,  Lymph: No cervical or supraclavicular palpable lymph nodes  Psych: Normal mood and affect.   Neurology: Carpal tunnel signs: tinel signs negative, Phalen signs positive, no focal neurological deficit, normal strength both upper and lower extremity, normal reflexes of lower extremity    I have reviewed pertinent labs and diagnostic tests associated with this visit with specific comments listed under the assessment and plan below      Assessment and Plan:     1. Anemia, unspecified type  2. History of hemolysis, elevated liver enzymes, and low platelet (HELLP) syndrome  -To make sure this abnormal lab resolved  - Comp Metabolic Panel; Future  - TSH WITH REFLEX TO FT4; Future  - Lipid Profile; Future  - VITAMIN D,25 HYDROXY; Future  - HEMOGLOBIN A1C; Future  - MICROALBUMIN CREAT RATIO URINE; Future  - INSULIN FASTING; Future  - URINALYSIS; Future    3. History of PCOS  - Comp Metabolic Panel; Future  - HEMOGLOBIN A1C; Future  - MICROALBUMIN CREAT RATIO URINE; Future  - INSULIN FASTING; Future  - URINALYSIS; Future    4. Chronic seasonal allergic rhinitis  -Continue antihistamine as needed    5. Health care maintenance  - CBC WITH DIFFERENTIAL; Future  - Comp Metabolic Panel; Future  - TSH WITH REFLEX TO FT4; Future  - Lipid Profile; Future  - VITAMIN D,25 HYDROXY; Future    6. Diabetes mellitus screening  - Comp Metabolic Panel; Future    7. Lipid screening  - Lipid Profile; Future    8. Tingling in extremities  -There is possibility of nerve entrapment of " obturator nerve distribution area/ilioinguinal nerve entrapment>> which could to be-during pregnancy or after abdominal surgeries  -Sometimes the situation are also common in athletic, , football player  - VITAMIN B1; Future  - VITAMIN B12; Future  - VITAMIN B6; Future  - REFERRAL TO NEUROLOGY      Follow Up:      Return in about 1 year (around 4/30/2021) for follow up.    Please note that this dictation was created using voice recognition software. I have made every reasonable attempt to correct obvious errors, but I expect that there are errors of grammar and possibly content that I did not discover before finalizing the note.    Signed by: Fozia Marcelo M.D.

## 2020-06-01 NOTE — PROGRESS NOTES
"Chief Complaint   Patient presents with   • New Patient     neuropathy       Problem List Items Addressed This Visit     None      Visit Diagnoses     Neuropathy        Relevant Orders    REFERRAL TO NEURODIAGNOSTICS (EEG,EP,EMG/NCS/DBS) Modality Requested: EMG-Comment Extremities (BLE and BUE)    Numbness and tingling in both hands        Relevant Orders    REFERRAL TO NEURODIAGNOSTICS (EEG,EP,EMG/NCS/DBS) Modality Requested: EMG-Comment Extremities (BLE and BUE)    Numbness and tingling of both legs        Relevant Orders    REFERRAL TO NEURODIAGNOSTICS (EEG,EP,EMG/NCS/DBS) Modality Requested: EMG-Comment Extremities (BLE and BUE)          History of present illness:  Chelle Richardson 25 y.o. female presents today for neuropathy evaluation.     Pt reports she started having constant numbness in her bilateral thigh after her  2019. This is ongoing per pt. She feels dull pain at times \"depending on how you touch them\" per pt. She has also noticed numbness, tingling and aching pain in her hands and fingers that radiates up to her forearms that started in 2020. She works as a  and is on the computer a lot and she is thinking she has CTS. She also reports of intermittent  numbness and tingling in her feet. They can feel heavy per pt. Denies any weakness. Physical activity can make the sx worse. Rest can make sx better. She has not tried anything for this. She states that she hasn't felt the sx in her hands and feet for about 2 months now. Sx don't wake her up at night. The numbness in her thighs is what concerns her the most because she was told it was abnormal.     She does not drink alcohol. Not smoking cigarettes. No recreational drug use.     No fam hx of neuropathy. No hx of elevation of blood sugar but she states that PCP will check her labs soon.     There's family hx of autoimmune disorder including RA.     Past medical history:   Past Medical History:   Diagnosis Date "   • Allergy    • Irregular periods    • Irregular periods 4/20/2018     IMO load March 2020   • Pre-conception counseling 4/20/2018   • Seasonal allergies        Past surgical history:   Past Surgical History:   Procedure Laterality Date   • PRIMARY C SECTION N/A 3/1/2019    Procedure: PRIMARY C SECTION;  Surgeon: Jo Ann Arellano M.D.;  Location: [facility];  Service: Labor and Delivery       Family history:   Family History   Problem Relation Age of Onset   • No Known Problems Mother    • Cancer Father         Skin    • Other Sister         fatty liver, PCOS   • No Known Problems Brother    • Cancer Maternal Grandmother         Breast    • Dementia Maternal Grandfather    • No Known Problems Paternal Grandmother    • Heart Disease Paternal Grandfather    • Heart Attack Paternal Grandfather    • No Known Problems Sister    • No Known Problems Sister    • No Known Problems Sister    • No Known Problems Sister    • No Known Problems Brother    • No Known Problems Brother        Social history:   Social History     Socioeconomic History   • Marital status:      Spouse name: Not on file   • Number of children: Not on file   • Years of education: Not on file   • Highest education level: Not on file   Occupational History   • Not on file   Social Needs   • Financial resource strain: Not on file   • Food insecurity     Worry: Not on file     Inability: Not on file   • Transportation needs     Medical: Not on file     Non-medical: Not on file   Tobacco Use   • Smoking status: Never Smoker   • Smokeless tobacco: Never Used   Substance and Sexual Activity   • Alcohol use: No   • Drug use: No   • Sexual activity: Yes     Partners: Male     Birth control/protection: Other-See Comments     Comment: trying to conceive, working as landscape designing   Lifestyle   • Physical activity     Days per week: Not on file     Minutes per session: Not on file   • Stress: Not on file   Relationships   • Social connections      Talks on phone: Not on file     Gets together: Not on file     Attends [employer] service: Not on file     Active member of club or organization: Not on file     Attends meetings of clubs or organizations: Not on file     Relationship status: Not on file   • Intimate partner violence     Fear of current or ex partner: Not on file     Emotionally abused: Not on file     Physically abused: Not on file     Forced sexual activity: Not on file   Other Topics Concern   • Not on file   Social History Narrative   • Not on file       Current medications:   Current Outpatient Medications   Medication   • ibuprofen (MOTRIN) 600 MG Tab   • PRENATAL 6.75-0.2 MG Tab     No current facility-administered medications for this visit.        Medication Allergy:  No Known Allergies    Review of systems:     General: Denies fevers or chills, or nightsweats, or generalized fatigue.    Head: Denies headaches or dizziness or lightheadedness  EENT: Denies vision changes, vision loss or pain, nasal secretion, nasal bleeding, difficulty swallowing, hearing loss, tinnitus, vertigo, ear pain  Respiratory: Denies shortness of breath, cough, sputum, or wheezing  Cardiac: Denies chest pain, palpitations, edema or syncope  Gastrointestinal: Denies nausea, vomiting, no abdominal pain or change in bowel habits, no melena or hematochezia  Urinary: Denies dysuria, frequency, hesitancy, or incontinence.  Dermatologic:  Denies new rash  Musculoskeletal: Denies muscle pain or swelling, no atrophy, no neck and back pain or stiffness.   Neurologic: Denies facial droopiness, muscle weakness (focal or generalized), ataxia, change in speech or language, memory loss, abnormal movements, seizures, loss of consciousness, or episodes of confusion. +paresthesias  Psychiatric: Denies anxiety, depression, mood swings, suicidal or homicidal thoughts       Physical examination:   Vitals:    06/02/20 0949   BP: 122/62   BP Location: Right arm   Patient Position: Sitting  "  BP Cuff Size: Adult   Pulse: 84   Resp: 16   Temp: 36.1 °C (97 °F)   TempSrc: Temporal   SpO2: 96%   Weight: 67.3 kg (148 lb 5.9 oz)   Height: 1.676 m (5' 6\")     General: Patient in no acute distress, pleasant and cooperative.  HEENT: Normocephalic, no signs of acute trauma.   Neck: Supple. There is normal range of motion.   Resp: clear to auscultation bilaterally. No wheezes or crackles.   CV: RRR, no murmurs.   Skin: no signs of acute rashes or trauma.   Musculoskeletal: joints exhibit full range of motion, without any pain to palpation. There are no signs of joint or muscle swelling. There is no tenderness to deep palpation of muscles.   Psychiatric: No hallucinatory behavior. No symptoms of depression or suicidal ideation. Mood and affect appear normal on exam.     NEUROLOGICAL EXAM:   Mental status, orientation: Awake, alert and fully oriented.   Speech and language: speech is clear and fluent. The patient is able to name, repeat and comprehend.   Memory: There is intact recollection of recent and remote events.   Cranial nerve exam:   CN I: Not examined   CN II: PERRL.   CN III, IV, VI: EOMI; no nystagmus   CN V: Facial sensation intact bilaterally   CN VII: face symmetric   CN VIII: hearing intact to finger rub bilaterally   CN IX, X: palate elevates symmetrically   CN XI: Symmetric shoulder shrug  CN XII: tongue midline. No signs of tongue biting or fasciculations   Motor exam: Strength is 5/5 in all extremities. Tone is normal. No abnormal movements were seen on exam.   Sensory exam reveals normal sense of light touch, proprioception, vibration and pinprick in all extremities.   Deep tendon reflexes:  2+ throughout. Plantar responses are flexor. There is no clonus.   Coordination: shows a normal finger-nose-finger. Normal rapidly alternating movements.   Gait: The patient was able to get up from seated position on first attempt without requiring assistance. Found to be steady when walking. Movements were " fluid with normal arm swing. The patient was able to turn without difficulties or tendency to fall. Romberg exam unremarkable    ANCILLARY DATA REVIEWED:     Lab Data Review:  Reviewed in chart.     Records reviewed:   Reviewed in chart.     Imaging:   n/a      ASSESSMENT AND PLAN:    1. Neuropathy  - REFERRAL TO NEURODIAGNOSTICS (EEG,EP,EMG/NCS/DBS) Modality Requested: EMG-Comment Extremities (BLE and BUE)    2. Numbness and tingling in both hands  - REFERRAL TO NEURODIAGNOSTICS (EEG,EP,EMG/NCS/DBS) Modality Requested: EMG-Comment Extremities (BLE and BUE)    3. Numbness and tingling of both legs  - REFERRAL TO NEURODIAGNOSTICS (EEG,EP,EMG/NCS/DBS) Modality Requested: EMG-Comment Extremities (BLE and BUE)        CLINICAL DISCUSSION:  Unknown if the numbness in her thighs after childbirth is related to peripheral neuropathy. Her sx in the upper extremities may be related to median or ulnar neuropathy. Her symptoms in her feet are now resolved. She has not felt the symptoms for about 2 months now. There was no hx of elevation in blood sugar. There is family hx of autoimmune disorder. Sx don't bother her much. They don't wake her up There's no pain or dysautonomia.     Plan:   -NCS/ EMG BLE and BUE    -Discuss using wrist splints especially at night.     -Discussed lifestyle modification including diet and exercise.      -PCP has pending lab work           FOLLOW-UP:   Return in about 4 weeks (around 6/30/2020).          EDUCATION AND COUNSELING:  -Discussed regular exercise program and prevention of cardiovascular disease, including stroke.   -Discussed healthy lifestyle, including: healthy diet (rich in fruits, vegetables, nuts and healthy oils); proper hydration, and adequate sleep hygiene (allowing 7-8 hrs of overnight sleep).    The patient understands and agrees that due to the complexity of his/her diagnosis, results of any testing and further recommendations will typically be discussed/made during a face to  face encounter in my office. The patient and/or family further understands it is their responsibility to keep proper follow up.         Ольга Wilson, MSN, APRN, FNP-C  [facility]  Office: [phone]  Fax: [phone]      BILLING DOCUMENTATION:     Counseling:  I spent greater than 50% time face-to-face time of a total of 61 minutes visit. Over 50% of the time of the visit today was spent on counseling and or coordination of care wtih the patient and/or family, with greater than 50% of the total discussing my assessment and plan as stated above.

## 2020-06-02 ENCOUNTER — OFFICE VISIT (OUTPATIENT)
Dept: NEUROLOGY | Facility: MEDICAL CENTER | Age: 25
End: 2020-06-02
Payer: COMMERCIAL

## 2020-06-02 VITALS
HEIGHT: 66 IN | HEART RATE: 84 BPM | OXYGEN SATURATION: 96 % | SYSTOLIC BLOOD PRESSURE: 122 MMHG | WEIGHT: 148.37 LBS | RESPIRATION RATE: 16 BRPM | DIASTOLIC BLOOD PRESSURE: 62 MMHG | TEMPERATURE: 97 F | BODY MASS INDEX: 23.84 KG/M2

## 2020-06-02 DIAGNOSIS — R20.0 NUMBNESS AND TINGLING OF BOTH LEGS: ICD-10-CM

## 2020-06-02 DIAGNOSIS — R20.0 NUMBNESS AND TINGLING IN BOTH HANDS: ICD-10-CM

## 2020-06-02 DIAGNOSIS — G62.9 NEUROPATHY: ICD-10-CM

## 2020-06-02 DIAGNOSIS — R20.2 NUMBNESS AND TINGLING IN BOTH HANDS: ICD-10-CM

## 2020-06-02 DIAGNOSIS — R20.2 NUMBNESS AND TINGLING OF BOTH LEGS: ICD-10-CM

## 2020-06-02 PROCEDURE — 99205 OFFICE O/P NEW HI 60 MIN: CPT | Performed by: NURSE PRACTITIONER

## 2020-06-02 ASSESSMENT — FIBROSIS 4 INDEX: FIB4 SCORE: 0.21

## 2020-06-02 ASSESSMENT — PATIENT HEALTH QUESTIONNAIRE - PHQ9: CLINICAL INTERPRETATION OF PHQ2 SCORE: 0

## 2020-06-29 ENCOUNTER — HOSPITAL ENCOUNTER (OUTPATIENT)
Dept: LAB | Facility: MEDICAL CENTER | Age: 25
End: 2020-06-29
Attending: INTERNAL MEDICINE
Payer: COMMERCIAL

## 2020-06-29 ENCOUNTER — HOSPITAL ENCOUNTER (OUTPATIENT)
Dept: LAB | Facility: MEDICAL CENTER | Age: 25
End: 2020-06-29
Payer: COMMERCIAL

## 2020-06-29 DIAGNOSIS — Z87.59 HISTORY OF HEMOLYSIS, ELEVATED LIVER ENZYMES, AND LOW PLATELET (HELLP) SYNDROME: ICD-10-CM

## 2020-06-29 DIAGNOSIS — Z87.42 HISTORY OF PCOS: ICD-10-CM

## 2020-06-29 DIAGNOSIS — D64.9 ANEMIA, UNSPECIFIED TYPE: ICD-10-CM

## 2020-06-29 DIAGNOSIS — Z13.220 LIPID SCREENING: ICD-10-CM

## 2020-06-29 DIAGNOSIS — Z00.00 HEALTH CARE MAINTENANCE: ICD-10-CM

## 2020-06-29 DIAGNOSIS — Z13.1 DIABETES MELLITUS SCREENING: ICD-10-CM

## 2020-06-29 DIAGNOSIS — R20.2 TINGLING IN EXTREMITIES: ICD-10-CM

## 2020-06-29 LAB
25(OH)D3 SERPL-MCNC: 31 NG/ML (ref 30–100)
ALBUMIN SERPL BCP-MCNC: 4.8 G/DL (ref 3.2–4.9)
ALBUMIN/GLOB SERPL: 1.9 G/DL
ALP SERPL-CCNC: 43 U/L (ref 30–99)
ALT SERPL-CCNC: 13 U/L (ref 2–50)
ANION GAP SERPL CALC-SCNC: 13 MMOL/L (ref 7–16)
APPEARANCE UR: ABNORMAL
AST SERPL-CCNC: 18 U/L (ref 12–45)
BACTERIA #/AREA URNS HPF: NEGATIVE /HPF
BASOPHILS # BLD AUTO: 0.4 % (ref 0–1.8)
BASOPHILS # BLD: 0.02 K/UL (ref 0–0.12)
BDY FAT % MEASURED: 23.7 %
BILIRUB SERPL-MCNC: 0.6 MG/DL (ref 0.1–1.5)
BILIRUB UR QL STRIP.AUTO: NEGATIVE
BP DIAS: 61 MMHG
BP SYS: 118 MMHG
BUN SERPL-MCNC: 15 MG/DL (ref 8–22)
CALCIUM SERPL-MCNC: 9.1 MG/DL (ref 8.5–10.5)
CHLORIDE SERPL-SCNC: 104 MMOL/L (ref 96–112)
CHOLEST SERPL-MCNC: 140 MG/DL (ref 100–199)
CHOLEST SERPL-MCNC: 141 MG/DL (ref 100–199)
CO2 SERPL-SCNC: 23 MMOL/L (ref 20–33)
COLOR UR: YELLOW
CREAT SERPL-MCNC: 0.51 MG/DL (ref 0.5–1.4)
CREAT UR-MCNC: 206.84 MG/DL
DIABETES HTDIA: NO
EOSINOPHIL # BLD AUTO: 0.02 K/UL (ref 0–0.51)
EOSINOPHIL NFR BLD: 0.4 % (ref 0–6.9)
EPI CELLS #/AREA URNS HPF: NEGATIVE /HPF
ERYTHROCYTE [DISTWIDTH] IN BLOOD BY AUTOMATED COUNT: 41 FL (ref 35.9–50)
EST. AVERAGE GLUCOSE BLD GHB EST-MCNC: 108 MG/DL
EVENT NAME HTEVT: NORMAL
FASTING HTFAS: YES
FASTING STATUS PATIENT QL REPORTED: NORMAL
GLOBULIN SER CALC-MCNC: 2.5 G/DL (ref 1.9–3.5)
GLUCOSE SERPL-MCNC: 94 MG/DL (ref 65–99)
GLUCOSE SERPL-MCNC: 95 MG/DL (ref 65–99)
GLUCOSE UR STRIP.AUTO-MCNC: NEGATIVE MG/DL
HBA1C MFR BLD: 5.4 % (ref 0–5.6)
HCT VFR BLD AUTO: 41.8 % (ref 37–47)
HDLC SERPL-MCNC: 51 MG/DL
HDLC SERPL-MCNC: 51 MG/DL
HGB BLD-MCNC: 13.9 G/DL (ref 12–16)
HYALINE CASTS #/AREA URNS LPF: ABNORMAL /LPF
HYPERTENSION HTHYP: NO
IMM GRANULOCYTES # BLD AUTO: 0.01 K/UL (ref 0–0.11)
IMM GRANULOCYTES NFR BLD AUTO: 0.2 % (ref 0–0.9)
KETONES UR STRIP.AUTO-MCNC: ABNORMAL MG/DL
LDLC SERPL CALC-MCNC: 82 MG/DL
LDLC SERPL CALC-MCNC: 83 MG/DL
LEUKOCYTE ESTERASE UR QL STRIP.AUTO: NEGATIVE
LYMPHOCYTES # BLD AUTO: 1.36 K/UL (ref 1–4.8)
LYMPHOCYTES NFR BLD: 24.1 % (ref 22–41)
MCH RBC QN AUTO: 30.6 PG (ref 27–33)
MCHC RBC AUTO-ENTMCNC: 33.3 G/DL (ref 33.6–35)
MCV RBC AUTO: 92.1 FL (ref 81.4–97.8)
MICRO URNS: ABNORMAL
MICROALBUMIN UR-MCNC: <1.2 MG/DL
MICROALBUMIN/CREAT UR: NORMAL MG/G (ref 0–30)
MONOCYTES # BLD AUTO: 0.2 K/UL (ref 0–0.85)
MONOCYTES NFR BLD AUTO: 3.5 % (ref 0–13.4)
NEUTROPHILS # BLD AUTO: 4.04 K/UL (ref 2–7.15)
NEUTROPHILS NFR BLD: 71.4 % (ref 44–72)
NITRITE UR QL STRIP.AUTO: NEGATIVE
NRBC # BLD AUTO: 0 K/UL
NRBC BLD-RTO: 0 /100 WBC
PH UR STRIP.AUTO: 5.5 [PH] (ref 5–8)
PLATELET # BLD AUTO: 279 K/UL (ref 164–446)
PMV BLD AUTO: 10.6 FL (ref 9–12.9)
POTASSIUM SERPL-SCNC: 4 MMOL/L (ref 3.6–5.5)
PROT SERPL-MCNC: 7.3 G/DL (ref 6–8.2)
PROT UR QL STRIP: NEGATIVE MG/DL
RBC # BLD AUTO: 4.54 M/UL (ref 4.2–5.4)
RBC # URNS HPF: ABNORMAL /HPF
RBC UR QL AUTO: ABNORMAL
SCREENING LOC CITY HTCIT: NORMAL
SCREENING LOC STATE HTSTA: NORMAL
SCREENING LOCATION HTLOC: NORMAL
SMOKING HTSMO: NO
SODIUM SERPL-SCNC: 140 MMOL/L (ref 135–145)
SP GR UR STRIP.AUTO: 1.03
SUBSCRIBER ID HTSID: NORMAL
TRIGL SERPL-MCNC: 35 MG/DL (ref 0–149)
TRIGL SERPL-MCNC: 35 MG/DL (ref 0–149)
TSH SERPL DL<=0.005 MIU/L-ACNC: 1.37 UIU/ML (ref 0.38–5.33)
UROBILINOGEN UR STRIP.AUTO-MCNC: 0.2 MG/DL
VIT B12 SERPL-MCNC: 522 PG/ML (ref 211–911)
WBC # BLD AUTO: 5.7 K/UL (ref 4.8–10.8)
WBC #/AREA URNS HPF: ABNORMAL /HPF

## 2020-06-29 PROCEDURE — 83525 ASSAY OF INSULIN: CPT

## 2020-06-29 PROCEDURE — 80053 COMPREHEN METABOLIC PANEL: CPT

## 2020-06-29 PROCEDURE — 82947 ASSAY GLUCOSE BLOOD QUANT: CPT

## 2020-06-29 PROCEDURE — 82306 VITAMIN D 25 HYDROXY: CPT

## 2020-06-29 PROCEDURE — 82043 UR ALBUMIN QUANTITATIVE: CPT

## 2020-06-29 PROCEDURE — 84207 ASSAY OF VITAMIN B-6: CPT

## 2020-06-29 PROCEDURE — 80061 LIPID PANEL: CPT | Mod: 91

## 2020-06-29 PROCEDURE — 84425 ASSAY OF VITAMIN B-1: CPT

## 2020-06-29 PROCEDURE — 36415 COLL VENOUS BLD VENIPUNCTURE: CPT

## 2020-06-29 PROCEDURE — 81001 URINALYSIS AUTO W/SCOPE: CPT

## 2020-06-29 PROCEDURE — 82607 VITAMIN B-12: CPT

## 2020-06-29 PROCEDURE — 85025 COMPLETE CBC W/AUTO DIFF WBC: CPT

## 2020-06-29 PROCEDURE — S5190 WELLNESS ASSESSMENT BY NONPH: HCPCS

## 2020-06-29 PROCEDURE — 83036 HEMOGLOBIN GLYCOSYLATED A1C: CPT

## 2020-06-29 PROCEDURE — 82570 ASSAY OF URINE CREATININE: CPT

## 2020-06-29 PROCEDURE — 80061 LIPID PANEL: CPT

## 2020-06-29 PROCEDURE — 84443 ASSAY THYROID STIM HORMONE: CPT

## 2020-07-01 LAB — INSULIN P FAST SERPL-ACNC: 7 UIU/ML (ref 3–19)

## 2020-07-02 DIAGNOSIS — Z20.822 CLOSE EXPOSURE TO COVID-19 VIRUS: ICD-10-CM

## 2020-07-02 LAB
VIT B1 BLD-MCNC: 147 NMOL/L (ref 70–180)
VIT B6 SERPL-MCNC: 91.2 NMOL/L (ref 20–125)

## 2020-07-03 NOTE — PROGRESS NOTES
1. Close exposure to COVID-19 virus  - MISCELLANEOUS LAB TEST ([facility]/Other); Future>> COVID-19 PCR test per patient request secondary to close contact with known patient diagnosed with COVID-19

## 2020-07-28 DIAGNOSIS — F33.2 SEVERE EPISODE OF RECURRENT MAJOR DEPRESSIVE DISORDER, WITHOUT PSYCHOTIC FEATURES (HCC): ICD-10-CM

## 2020-07-28 DIAGNOSIS — R45.851 SUICIDAL THOUGHTS: ICD-10-CM

## 2020-07-28 NOTE — PROGRESS NOTES
1. Severe episode of recurrent major depressive disorder, without psychotic features (HCC)  - REFERRAL TO BEHAVIORAL HEALTH    2. Suicidal thoughts  - REFERRAL TO BEHAVIORAL HEALTH

## 2020-08-17 ENCOUNTER — TELEMEDICINE (OUTPATIENT)
Dept: BEHAVIORAL HEALTH | Facility: CLINIC | Age: 25
End: 2020-08-17
Payer: COMMERCIAL

## 2020-08-17 DIAGNOSIS — F43.23 ADJUSTMENT DISORDER WITH MIXED ANXIETY AND DEPRESSED MOOD: ICD-10-CM

## 2020-08-17 DIAGNOSIS — F41.8 DEPRESSION WITH ANXIETY: ICD-10-CM

## 2020-08-17 DIAGNOSIS — Z56.6 WORK-RELATED STRESS: ICD-10-CM

## 2020-08-17 PROCEDURE — 90791 PSYCH DIAGNOSTIC EVALUATION: CPT | Performed by: MARRIAGE & FAMILY THERAPIST

## 2020-08-17 SDOH — HEALTH STABILITY - MENTAL HEALTH: OTHER PHYSICAL AND MENTAL STRAIN RELATED TO WORK: Z56.6

## 2020-08-17 NOTE — BH THERAPY
[facility]  INITIAL ASSESSMENT    Name: Chelle Richardsno  MRN: [id number]  : 1995  Age: 25 y.o.  Date of assessment: 2020  PCP: Fozia Marcelo M.D.  Persons in attendance: Patient  Total session time: 45 minutes      CHIEF COMPLAINT AND HISTORY OF PRESENTING PROBLEM:  (as stated by Patient):  Chelle Richardson is a 25 y.o., White female referred for assessment by Fozia Marcelo M.D..  Primary presenting issue includes   Chief Complaint   Patient presents with   • Depression   • Anxiety   . This confidential 45 min zoom behavioral health assessment was authorized by covid and approved by pt. 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 Yes  Family History   Problem Relation Age of Onset   • No Known Problems Mother    • Cancer Father         Skin    • Other Sister         fatty liver, PCOS   • No Known Problems Brother    • Cancer Maternal Grandmother         Breast    • Dementia Maternal Grandfather    • No Known Problems Paternal Grandmother    • Heart Disease Paternal Grandfather    • Heart Attack Paternal Grandfather    • No Known Problems Sister    • No Known Problems Sister    • No Known Problems Sister    • No Known Problems Sister    • No Known Problems Brother    • No Known Problems Brother         BEHAVIORAL HEALTH TREATMENT HISTORY  Does patient/parent report a history of prior behavioral health treatment for patient? No:    History of untreated behavioral health issues identified? No    MEDICAL HISTORY  Primary care  behavioral health screenings: Patient Health Questionaire                                     If depressive symptoms identified deferred to follow up visit unless specifically addressed in assesment and plan.    Interpretation of PHQ-9 Total Score   Score Severity   1-4 No Depression   5-9 Mild Depression   10-14 Moderate Depression   15-19 Moderately Severe Depression   20-27 Severe Depression       Past medical/surgical history:   Past Medical History:   Diagnosis Date   • Allergy    • Irregular periods    • Irregular periods 4/20/2018     IMO load March 2020   • Pre-conception counseling 4/20/2018   • Seasonal allergies       Past Surgical History:   Procedure Laterality Date   • PRIMARY C SECTION N/A 3/1/2019    Procedure: PRIMARY C SECTION;  Surgeon: Jo Ann Arellano M.D.;  Location: LABOR AND DELIVERY;  Service: Labor and Delivery        Medication Allergies:  Patient has no known allergies.   Medical history provided by patient during current evaluation: limited    Patient reports last physical exam: 2020  Does patient/parent report any history of or current developmental concerns? No  Does patient/parent report nutritional concerns? No  Does patient/parent report change in appetite or weight loss/gain? No  Does patient/parent report history of eating disorder symptoms? No  Does patient/parent report dental problem? No  Does patient/parent report physical pain? No   Indicate if pain is acute or chronic, and location: n/a   Pain scale rating:       Does patient/parent report functional impact of medical, developmental, or pain issues?   no    EDUCATIONAL/LEARNING HISTORY  Is patient currently enrolled in a school/educational program?   No:   Highest grade level completed: 12  School performance/functioning: good  History of Special Education/repeated grades/learning issues: no  Preferred learning style: all  Current learning needs (large print, language barrier, etc):  none    EMPLOYMENT/RESOURCES  Is the  patient currently employed? [employer]   Does the patient/parent report adequate financial resources? Yes  Does patient identify impact of presenting issue on work functioning? Yes boss expects a lot  Work or income-related stressors:  Boss is demanding     HISTORY:  Does patient report current or past enlistment? No    [If yes, complete below items]  Does patient report history of exposure to combat? No  Does patient report history of  sexual trauma? No  Does patient report other -related stressors? No    SPIRITUAL/CULTURAL/IDENTITY:  What are the patient’s/family’s spiritual beliefs or practices? spiritual  What is the patient’s cultural or ethnic background/identity? cauc  How does the patient identify their sexual orientation? hetero  How does the patient identify their gender? female  Does the patient identify any spiritual/cultural/identity factors as relevant to the presenting issue? No    LEGAL HISTORY  Has the patient ever been involved with juvenile, adult, or family legal systems? No   [If yes, trigger section below:]  Does patient report ever being a victim of a crime?  No  Does patient report involvement in any current legal issues?  No  Does patient report ever being arrested or committing a crime? No  Does patient report any current agency (parole/probation/CPS/) involvement? No    ABUSE/NEGLECT/TRAUMA SCREENING  Does patient report feeling “unsafe” in his/her home, or afraid of anyone? No  Does patient report any history of physical, sexual, or emotional abuse? No  Does parent or significant other report any of the above? No  Is there evidence of neglect by self? No  Is there evidence of neglect by a caregiver? No  Does the patient/parent report any history of CPS/APS/police involvement related to suspected abuse/neglect or domestic violence? No  Does the patient/parent report any other history of potentially traumatic life events? No  Based on the  information provided during the current assessment, is a mandated report of suspected abuse/neglect being made?  No     SAFETY ASSESSMENT - SELF  Does patient acknowledge current or past symptoms of dangerousness to self? No  Does parent/significant other report patient has current or past symptoms of dangerousness to self? No      Recent change in frequency/specificity/intensity of suicidal thoughts or self-harm behavior? No  Current access to firearms, medications, or other identified means of suicide/self-harm? No  If yes, willing to restrict access to means of suicide/self-harm? Yes  Protective factors present: Future-oriented    Current Suicide Risk: Not applicable  Crisis Safety Plan completed and copy given to patient: No    SAFETY ASSESSMENT - OTHERS  Does paor past symptoms of aggressive behavior or risk to others? No  Does parent/significant othtient acknowledge current or past symptoms of aggressive behavior or risk to others? No  Does parent/significant other report patient has current or past symptoms of aggressive behavior or risk to others? No    Recent change in frequency/specificity/intensity of thoughts or threats to harm others? No  Current access to firearms/other identified means of harm? No  If yes, willing to restrict access to weapons/means of harm? No  Protective factors present: Stable relationships and Stable employment    Current Homicide Risk:  Not applicable  Crisis Safety Plan completed and copy given to patient? No  Based on information provided during the current assessment, is a mandated “duty to warn” being exercised? No    SUBSTANCE USE/ADDICTION HISTORY  [] Not applicable - patient 10 years of age or younger    Is there a family history of substance use/addiction? No  Does patient acknowledge or parent/significant other report use of/dependence on substances? No  Last time patient used alcohol: HS  Within the past week? No  Last time patient used marijuana: HS  Within the past  month? No  Any other street drugs ever tried even once? No  Any use of prescription medications/pills without a prescription, or for reasons others than originally prescribed?  No  Any other addictive behavior reported (gambling, shopping, sex)? No     Drug History:  Amphetamine:      Cannibis:      Cocaine:      Ecstasy:      Hallucinogen:      Inhalant:       Opiate:      Other:      Sedative:           What consequences does the patient associate with any of the above substance use and or addictive behaviors? None    Patient’s motivation/readiness for change: mild    [] Patient denies use of any substance/addictive behaviors    STRENGTHS/ASSETS  Strengths Identified by interviewer: Stable relationships  Strengths Identified by patient: pt has a supportive family    MENTAL STATUS/OBSERVATIONS   Participation: Active verbal participation  Grooming: Casual  Orientation:Alert   Behavior: Tense  Eye contact: Limited   Mood:Depressed and Anxious  Affect:Sad and Tearful  Thought process: Goal-directed  Thought content:  Within normal limits  Speech: Rate within normal limits and Volume within normal limits  Perception: Within normal limits  Memory: No gross evidence of memory deficits  Insight: Adequate  Judgment:  Adequate  Other:    Family/couple interaction observations: n/a    RESULTS OF SCREENING MEASURES:  [] Not applicable  Measure:   Score:     Measure:   Score:       CLINICAL FORMULATION: pt is anxious and depressed about being a mother with an 18 month old da and working and a new puppy with a demanding boss       DIAGNOSTIC IMPRESSION(S):  No diagnosis found.      IDENTIFIED NEEDS/PLAN:  [If any of these marked, trigger DISPOSITION list]  Mood/anxiety and Occupational/Educational  Refer to [facility]: Outpatient Therapy    Does patient express agreement with the above plan? Yes     Referral appointment(s) scheduled? Yes       EYAL Denson.

## 2020-10-12 ENCOUNTER — TELEMEDICINE (OUTPATIENT)
Dept: BEHAVIORAL HEALTH | Facility: CLINIC | Age: 25
End: 2020-10-12
Payer: COMMERCIAL

## 2020-10-12 DIAGNOSIS — F43.23 ADJUSTMENT DISORDER WITH MIXED ANXIETY AND DEPRESSED MOOD: ICD-10-CM

## 2020-10-12 DIAGNOSIS — Z63.0 MARITAL PROBLEMS: ICD-10-CM

## 2020-10-12 PROCEDURE — 90834 PSYTX W PT 45 MINUTES: CPT | Mod: 95,CR | Performed by: MARRIAGE & FAMILY THERAPIST

## 2020-10-12 SDOH — SOCIAL STABILITY - SOCIAL INSECURITY: PROBLEMS IN RELATIONSHIP WITH SPOUSE OR PARTNER: Z63.0

## 2020-10-12 NOTE — BH THERAPY
" [facility]  Therapy Progress Note    Patient Name: Chelle Richardson  Patient MRN: [id number]  Today's Date: 10/12/2020     Type of session:Individual psychotherapy  Length of session: 45 minutes  Persons in attendance:Patient    Subjective/New Info: this confidential 45 min zoom psychotherapy session was authorized by covid and approved by pt. Pt is agitated bc she does not ask for what she needs from her H    Objective/Observations:   Participation: Active verbal participation   Grooming: Casual   Cognition: Alert   Eye contact: Good   Mood: Depressed, Anxious and Angry   Affect: Full range   Thought process: Goal-directed   Speech: Rate within normal limits and Volume within normal limits   Other:     Diagnoses: No diagnosis found.     Current risk:   SUICIDE: Not applicable   Homicide: Not applicable   Self-harm: Not applicable   Relapse: Not applicable   Other:    Safety Plan reviewed? Not Indicated   If evidence of imminent risk is present, intervention/plan:     Therapeutic Intervention(s): Clarify:  Clarify feelings and Clarify thoughts, Cognitive modification, Positive behavior reinforced, Self-care skills, Stressors assessed and Supportive psychotherapy    Treatment Goal(s)/Objective(s) addressed: id pt stressors including not asking her H for help around the house, clarified pt is afraid to ask for what she needs from her H, using cognitive mod discussed the positive affect expressing needs and wants can have on a relationship, reinforced pt for her willingness to work toward asking for what she needs and wants from her H, encouraged pt to start saying I need and fill in the blank and provided support for pt    Progress toward Treatment Goals: Mild improvement    Plan:  - \"Homework\" recommendation: pt agreed to practice saying out loud \"I need...\" between now and next therapy session and to report back in next session how this went    Lindsey Naranjo, " IGLESIA  10/12/2020

## 2020-10-13 ENCOUNTER — OFFICE VISIT (OUTPATIENT)
Dept: URGENT CARE | Facility: PHYSICIAN GROUP | Age: 25
End: 2020-10-13
Payer: COMMERCIAL

## 2020-10-13 ENCOUNTER — HOSPITAL ENCOUNTER (OUTPATIENT)
Facility: MEDICAL CENTER | Age: 25
End: 2020-10-13
Attending: PHYSICIAN ASSISTANT
Payer: COMMERCIAL

## 2020-10-13 VITALS
OXYGEN SATURATION: 100 % | SYSTOLIC BLOOD PRESSURE: 116 MMHG | WEIGHT: 148 LBS | HEART RATE: 95 BPM | RESPIRATION RATE: 16 BRPM | HEIGHT: 65 IN | BODY MASS INDEX: 24.66 KG/M2 | DIASTOLIC BLOOD PRESSURE: 62 MMHG | TEMPERATURE: 98.7 F

## 2020-10-13 DIAGNOSIS — Z20.828 CONTACT W AND EXPOSURE TO OTH VIRAL COMMUNICABLE DISEASES: ICD-10-CM

## 2020-10-13 DIAGNOSIS — R53.83 FATIGUE, UNSPECIFIED TYPE: ICD-10-CM

## 2020-10-13 DIAGNOSIS — R11.0 NAUSEA: ICD-10-CM

## 2020-10-13 PROCEDURE — U0003 INFECTIOUS AGENT DETECTION BY NUCLEIC ACID (DNA OR RNA); SEVERE ACUTE RESPIRATORY SYNDROME CORONAVIRUS 2 (SARS-COV-2) (CORONAVIRUS DISEASE [COVID-19]), AMPLIFIED PROBE TECHNIQUE, MAKING USE OF HIGH THROUGHPUT TECHNOLOGIES AS DESCRIBED BY CMS-2020-01-R: HCPCS

## 2020-10-13 PROCEDURE — 99214 OFFICE O/P EST MOD 30 MIN: CPT | Mod: CS | Performed by: PHYSICIAN ASSISTANT

## 2020-10-13 RX ORDER — ONDANSETRON 4 MG/1
4 TABLET, FILM COATED ORAL EVERY 4 HOURS PRN
Qty: 20 TAB | Refills: 0 | Status: ON HOLD
Start: 2020-10-13 | End: 2021-08-06

## 2020-10-13 RX ORDER — CETIRIZINE HYDROCHLORIDE 10 MG/1
10 TABLET ORAL DAILY
Status: ON HOLD | COMMUNITY
End: 2021-08-08

## 2020-10-13 ASSESSMENT — ENCOUNTER SYMPTOMS
DIZZINESS: 0
SPUTUM PRODUCTION: 0
ABDOMINAL PAIN: 0
NAUSEA: 1
DIAPHORESIS: 0
SINUS PAIN: 0
MYALGIAS: 0
CHILLS: 1
SORE THROAT: 0
SHORTNESS OF BREATH: 0
VOMITING: 0
STRIDOR: 0
DIARRHEA: 0
WHEEZING: 0
HEARTBURN: 0
COUGH: 1
HEADACHES: 0
PALPITATIONS: 0
FEVER: 0

## 2020-10-13 ASSESSMENT — FIBROSIS 4 INDEX: FIB4 SCORE: 0.48

## 2020-10-13 NOTE — PROGRESS NOTES
Subjective:   Chelle Richardson is a 25 y.o. female who presents for Nausea (fatigue, cough, x 2 days )    HPI:  This is a very pleasant 25-year-old female accompanied to clinic by her [id number] daughter.  Patient states she had a recent close COVID-19 contact exposure.  Her brother recently tested positive and they were around him this weekend.  Over the last 2 days she has noticed increased fatigue and chills.  She describes some intermittent nausea without any vomiting or diarrhea.  She has not had a fever.  She has a cough that is mild intermittent and nonproductive.  She has not currently done anything for symptoms.  She is requesting a COVID-19 test at this time.  She denies any shortness of breath or chest pain.    Review of Systems   Constitutional: Positive for chills and malaise/fatigue. Negative for diaphoresis and fever.   HENT: Negative for congestion, ear pain, sinus pain and sore throat.    Respiratory: Positive for cough. Negative for sputum production, shortness of breath, wheezing and stridor.    Cardiovascular: Negative for chest pain and palpitations.   Gastrointestinal: Positive for nausea. Negative for abdominal pain, diarrhea, heartburn and vomiting.   Musculoskeletal: Negative for myalgias.   Neurological: Negative for dizziness and headaches.   Endo/Heme/Allergies: Negative for environmental allergies.       Medications:    • cetirizine Tabs  • ondansetron Tabs  • Prenatal Tabs    Allergies: Patient has no known allergies.    Problem List: Chelle Richardson has Chronic seasonal allergic rhinitis; Anemia; History of hemolysis, elevated liver enzymes, and low platelet (HELLP) syndrome; History of PCOS; Tingling in extremities; Close exposure to COVID-19 virus; Severe episode of recurrent major depressive disorder (HCC); Suicidal thoughts; and Depression with anxiety on their problem list.    Surgical History:  Past Surgical History:   Procedure Laterality Date   • PRIMARY C SECTION N/A  "3/1/2019    Procedure: PRIMARY C SECTION;  Surgeon: Jo Ann Arellano M.D.;  Location: LABOR AND DELIVERY;  Service: Labor and Delivery       Past Social Hx: Chelle Richardson  reports that she has never smoked. She has never used smokeless tobacco. She reports that she does not drink alcohol or use drugs.     Past Family Hx:  Chelle Richardson family history includes Cancer in her father and maternal grandmother; Dementia in her maternal grandfather; Heart Attack in her paternal grandfather; Heart Disease in her paternal grandfather; No Known Problems in her brother, brother, brother, mother, paternal grandmother, sister, sister, sister, and sister; Other in her sister.     Problem list, medications, and allergies reviewed by myself today in Epic.     Objective:     /62 (BP Location: Left arm, Patient Position: Sitting, BP Cuff Size: Adult)   Pulse 95   Temp 37.1 °C (98.7 °F) (Temporal)   Resp 16   Ht 1.651 m (5' 5\")   Wt 67.1 kg (148 lb)   LMP 09/22/2020   SpO2 100%   BMI 24.63 kg/m²     Physical Exam  Constitutional:       General: She is not in acute distress.     Appearance: Normal appearance. She is not ill-appearing, toxic-appearing or diaphoretic.   HENT:      Head: Normocephalic and atraumatic.      Right Ear: Tympanic membrane, ear canal and external ear normal.      Left Ear: Tympanic membrane, ear canal and external ear normal.      Nose: Nose normal. No congestion or rhinorrhea.      Mouth/Throat:      Mouth: Mucous membranes are moist.      Pharynx: No oropharyngeal exudate or posterior oropharyngeal erythema.   Eyes:      Conjunctiva/sclera: Conjunctivae normal.   Neck:      Musculoskeletal: Normal range of motion. No neck rigidity or muscular tenderness.   Cardiovascular:      Rate and Rhythm: Normal rate and regular rhythm.      Pulses: Normal pulses.      Heart sounds: Normal heart sounds.   Pulmonary:      Effort: Pulmonary effort is normal.      Breath sounds: Normal breath sounds. No " wheezing, rhonchi or rales.   Abdominal:      General: Bowel sounds are normal.      Palpations: Abdomen is soft.      Tenderness: There is no abdominal tenderness. There is no right CVA tenderness, left CVA tenderness or guarding.   Lymphadenopathy:      Cervical: No cervical adenopathy.   Skin:     General: Skin is warm and dry.      Capillary Refill: Capillary refill takes less than 2 seconds.   Neurological:      General: No focal deficit present.      Mental Status: She is alert and oriented to person, place, and time. Mental status is at baseline.   Psychiatric:         Mood and Affect: Mood normal.         Thought Content: Thought content normal.       COVID-19: Pending    Assessment/Plan:     Diagnosis and associated orders:     1. Nausea  COVID/SARS COV-2 PCR    ondansetron (ZOFRAN) 4 MG Tab tablet   2. Contact w and exposure to oth viral communicable diseases  COVID/SARS COV-2 PCR   3. Fatigue, unspecified type  COVID/SARS COV-2 PCR      Comments/MDM:     Recommended strict isolation precautions.  Stay isolated until I reach out with final results.  Results may take 2 to 3 days.  Recommended supportive treatment including increase fluids and rest.  Use Tylenol and ibuprofen as needed for pain and fever.  Red flag symptoms were discussed with the patient at length today.  If any of these symptoms present return to the clinic or nearest emergency department.  Please call with any questions or concerns.           Differential diagnosis, natural history, supportive care, and indications for immediate follow-up discussed.    Advised the patient to follow-up with the primary care physician for recheck, reevaluation, and consideration of further management.    Please note that this dictation was created using voice recognition software. I have made reasonable attempt to correct obvious errors, but I expect that there are errors of grammar and possibly content that I did not discover before finalizing the  note.    This note was electronically signed by MANOLO Mendez PA-C

## 2020-10-14 DIAGNOSIS — Z20.828 CONTACT W AND EXPOSURE TO OTH VIRAL COMMUNICABLE DISEASES: ICD-10-CM

## 2020-10-14 DIAGNOSIS — R11.0 NAUSEA: ICD-10-CM

## 2020-10-14 DIAGNOSIS — R53.83 FATIGUE, UNSPECIFIED TYPE: ICD-10-CM

## 2020-10-14 LAB
COVID ORDER STATUS COVID19: NORMAL
SARS-COV-2 RNA RESP QL NAA+PROBE: NOTDETECTED
SPECIMEN SOURCE: NORMAL

## 2021-01-06 ENCOUNTER — HOSPITAL ENCOUNTER (OUTPATIENT)
Dept: LAB | Facility: MEDICAL CENTER | Age: 26
End: 2021-01-06
Attending: OBSTETRICS & GYNECOLOGY
Payer: COMMERCIAL

## 2021-01-06 LAB
ABO GROUP BLD: NORMAL
BASOPHILS # BLD AUTO: 0.2 % (ref 0–1.8)
BASOPHILS # BLD: 0.02 K/UL (ref 0–0.12)
BLD GP AB SCN SERPL QL: NORMAL
EOSINOPHIL # BLD AUTO: 0.03 K/UL (ref 0–0.51)
EOSINOPHIL NFR BLD: 0.3 % (ref 0–6.9)
ERYTHROCYTE [DISTWIDTH] IN BLOOD BY AUTOMATED COUNT: 45.6 FL (ref 35.9–50)
HBV SURFACE AG SER QL: NORMAL
HCT VFR BLD AUTO: 38.9 % (ref 37–47)
HCV AB SER QL: NORMAL
HGB BLD-MCNC: 12.9 G/DL (ref 12–16)
HIV 1+2 AB+HIV1 P24 AG SERPL QL IA: NORMAL
IMM GRANULOCYTES # BLD AUTO: 0.04 K/UL (ref 0–0.11)
IMM GRANULOCYTES NFR BLD AUTO: 0.4 % (ref 0–0.9)
LYMPHOCYTES # BLD AUTO: 1.9 K/UL (ref 1–4.8)
LYMPHOCYTES NFR BLD: 19.4 % (ref 22–41)
MCH RBC QN AUTO: 30.7 PG (ref 27–33)
MCHC RBC AUTO-ENTMCNC: 33.2 G/DL (ref 33.6–35)
MCV RBC AUTO: 92.6 FL (ref 81.4–97.8)
MONOCYTES # BLD AUTO: 0.49 K/UL (ref 0–0.85)
MONOCYTES NFR BLD AUTO: 5 % (ref 0–13.4)
NEUTROPHILS # BLD AUTO: 7.33 K/UL (ref 2–7.15)
NEUTROPHILS NFR BLD: 74.7 % (ref 44–72)
NRBC # BLD AUTO: 0 K/UL
NRBC BLD-RTO: 0 /100 WBC
PLATELET # BLD AUTO: 296 K/UL (ref 164–446)
PMV BLD AUTO: 13.3 FL (ref 9–12.9)
RBC # BLD AUTO: 4.2 M/UL (ref 4.2–5.4)
RH BLD: NORMAL
RUBV AB SER QL: 99 IU/ML
TREPONEMA PALLIDUM IGG+IGM AB [PRESENCE] IN SERUM OR PLASMA BY IMMUNOASSAY: NORMAL
WBC # BLD AUTO: 9.8 K/UL (ref 4.8–10.8)

## 2021-01-06 PROCEDURE — 85025 COMPLETE CBC W/AUTO DIFF WBC: CPT

## 2021-01-06 PROCEDURE — 87491 CHLMYD TRACH DNA AMP PROBE: CPT

## 2021-01-06 PROCEDURE — 86780 TREPONEMA PALLIDUM: CPT

## 2021-01-06 PROCEDURE — 86901 BLOOD TYPING SEROLOGIC RH(D): CPT

## 2021-01-06 PROCEDURE — 87591 N.GONORRHOEAE DNA AMP PROB: CPT

## 2021-01-06 PROCEDURE — 86803 HEPATITIS C AB TEST: CPT

## 2021-01-06 PROCEDURE — 86762 RUBELLA ANTIBODY: CPT

## 2021-01-06 PROCEDURE — 86900 BLOOD TYPING SEROLOGIC ABO: CPT

## 2021-01-06 PROCEDURE — 87086 URINE CULTURE/COLONY COUNT: CPT

## 2021-01-06 PROCEDURE — 86850 RBC ANTIBODY SCREEN: CPT

## 2021-01-06 PROCEDURE — 87389 HIV-1 AG W/HIV-1&-2 AB AG IA: CPT

## 2021-01-06 PROCEDURE — 88175 CYTOPATH C/V AUTO FLUID REDO: CPT

## 2021-01-06 PROCEDURE — 36415 COLL VENOUS BLD VENIPUNCTURE: CPT

## 2021-01-06 PROCEDURE — 87340 HEPATITIS B SURFACE AG IA: CPT

## 2021-01-07 LAB — CYTOLOGY REG CYTOL: NORMAL

## 2021-01-08 LAB
BACTERIA UR CULT: NORMAL
SIGNIFICANT IND 70042: NORMAL
SITE SITE: NORMAL
SOURCE SOURCE: NORMAL

## 2021-01-11 ENCOUNTER — HOSPITAL ENCOUNTER (OUTPATIENT)
Dept: LAB | Facility: MEDICAL CENTER | Age: 26
End: 2021-01-11
Attending: OBSTETRICS & GYNECOLOGY
Payer: COMMERCIAL

## 2021-01-14 LAB
C TRACH DNA GENITAL QL NAA+PROBE: NEGATIVE
N GONORRHOEA DNA GENITAL QL NAA+PROBE: NEGATIVE
SPECIMEN SOURCE: NORMAL

## 2021-02-05 ENCOUNTER — HOSPITAL ENCOUNTER (OUTPATIENT)
Dept: LAB | Facility: MEDICAL CENTER | Age: 26
End: 2021-02-05
Attending: OBSTETRICS & GYNECOLOGY
Payer: COMMERCIAL

## 2021-02-05 PROCEDURE — 36415 COLL VENOUS BLD VENIPUNCTURE: CPT

## 2021-02-05 PROCEDURE — 82105 ALPHA-FETOPROTEIN SERUM: CPT

## 2021-02-10 LAB
# FETUSES US: NORMAL
AFP MOM SERPL: 0.87
AFP SERPL-MCNC: 24 NG/ML
AGE - REPORTED: 28.3 YR
CURRENT SMOKER: NO
FAMILY MEMBER DISEASES HX: NO
GA METHOD: NORMAL
GA: NORMAL WK
IDDM PATIENT QL: NO
INTEGRATED SCN PATIENT-IMP: NORMAL
SPECIMEN DRAWN SERPL: NORMAL

## 2021-04-16 ENCOUNTER — HOSPITAL ENCOUNTER (OUTPATIENT)
Facility: MEDICAL CENTER | Age: 26
End: 2021-04-16
Attending: OBSTETRICS & GYNECOLOGY
Payer: COMMERCIAL

## 2021-04-16 ENCOUNTER — HOSPITAL ENCOUNTER (OUTPATIENT)
Dept: LAB | Facility: MEDICAL CENTER | Age: 26
End: 2021-04-16
Attending: OBSTETRICS & GYNECOLOGY
Payer: COMMERCIAL

## 2021-04-16 LAB
GLUCOSE 1H P 50 G GLC PO SERPL-MCNC: 72 MG/DL (ref 70–139)
HCT VFR BLD AUTO: 38.4 % (ref 37–47)
HGB BLD-MCNC: 12.8 G/DL (ref 12–16)
PLATELET # BLD AUTO: 259 K/UL (ref 164–446)
TREPONEMA PALLIDUM IGG+IGM AB [PRESENCE] IN SERUM OR PLASMA BY IMMUNOASSAY: NORMAL

## 2021-04-16 PROCEDURE — 85049 AUTOMATED PLATELET COUNT: CPT

## 2021-04-16 PROCEDURE — 85014 HEMATOCRIT: CPT

## 2021-04-16 PROCEDURE — 82950 GLUCOSE TEST: CPT

## 2021-04-16 PROCEDURE — 85018 HEMOGLOBIN: CPT

## 2021-04-16 PROCEDURE — 86780 TREPONEMA PALLIDUM: CPT

## 2021-04-16 PROCEDURE — 36415 COLL VENOUS BLD VENIPUNCTURE: CPT

## 2021-06-22 ENCOUNTER — HOSPITAL ENCOUNTER (OUTPATIENT)
Dept: LAB | Facility: MEDICAL CENTER | Age: 26
End: 2021-06-22
Attending: OBSTETRICS & GYNECOLOGY
Payer: COMMERCIAL

## 2021-06-22 PROCEDURE — 87150 DNA/RNA AMPLIFIED PROBE: CPT

## 2021-06-22 PROCEDURE — 87081 CULTURE SCREEN ONLY: CPT

## 2021-06-23 LAB — GP B STREP DNA SPEC QL NAA+PROBE: POSITIVE

## 2021-08-06 ENCOUNTER — HOSPITAL ENCOUNTER (INPATIENT)
Facility: MEDICAL CENTER | Age: 26
LOS: 2 days | End: 2021-08-08
Attending: OBSTETRICS & GYNECOLOGY | Admitting: OBSTETRICS & GYNECOLOGY
Payer: COMMERCIAL

## 2021-08-06 ENCOUNTER — ANESTHESIA (OUTPATIENT)
Dept: OBGYN | Facility: MEDICAL CENTER | Age: 26
End: 2021-08-06
Payer: COMMERCIAL

## 2021-08-06 ENCOUNTER — ANESTHESIA EVENT (OUTPATIENT)
Dept: OBGYN | Facility: MEDICAL CENTER | Age: 26
End: 2021-08-06
Payer: COMMERCIAL

## 2021-08-06 LAB
BASOPHILS # BLD AUTO: 0.2 % (ref 0–1.8)
BASOPHILS # BLD: 0.02 K/UL (ref 0–0.12)
EOSINOPHIL # BLD AUTO: 0.02 K/UL (ref 0–0.51)
EOSINOPHIL NFR BLD: 0.2 % (ref 0–6.9)
ERYTHROCYTE [DISTWIDTH] IN BLOOD BY AUTOMATED COUNT: 42.2 FL (ref 35.9–50)
HCT VFR BLD AUTO: 38.6 % (ref 37–47)
HGB BLD-MCNC: 13.5 G/DL (ref 12–16)
HOLDING TUBE BB 8507: NORMAL
IMM GRANULOCYTES # BLD AUTO: 0.06 K/UL (ref 0–0.11)
IMM GRANULOCYTES NFR BLD AUTO: 0.6 % (ref 0–0.9)
LYMPHOCYTES # BLD AUTO: 2.2 K/UL (ref 1–4.8)
LYMPHOCYTES NFR BLD: 20.3 % (ref 22–41)
MCH RBC QN AUTO: 32.5 PG (ref 27–33)
MCHC RBC AUTO-ENTMCNC: 35 G/DL (ref 33.6–35)
MCV RBC AUTO: 92.8 FL (ref 81.4–97.8)
MONOCYTES # BLD AUTO: 0.63 K/UL (ref 0–0.85)
MONOCYTES NFR BLD AUTO: 5.8 % (ref 0–13.4)
NEUTROPHILS # BLD AUTO: 7.92 K/UL (ref 2–7.15)
NEUTROPHILS NFR BLD: 72.9 % (ref 44–72)
NRBC # BLD AUTO: 0 K/UL
NRBC BLD-RTO: 0 /100 WBC
PLATELET # BLD AUTO: 205 K/UL (ref 164–446)
PMV BLD AUTO: 12.2 FL (ref 9–12.9)
RBC # BLD AUTO: 4.16 M/UL (ref 4.2–5.4)
SARS-COV+SARS-COV-2 AG RESP QL IA.RAPID: NOTDETECTED
SPECIMEN SOURCE: NORMAL
WBC # BLD AUTO: 10.9 K/UL (ref 4.8–10.8)

## 2021-08-06 PROCEDURE — A9270 NON-COVERED ITEM OR SERVICE: HCPCS | Performed by: ANESTHESIOLOGY

## 2021-08-06 PROCEDURE — 700101 HCHG RX REV CODE 250: Performed by: ANESTHESIOLOGY

## 2021-08-06 PROCEDURE — 160048 HCHG OR STATISTICAL LEVEL 1-5: Performed by: OBSTETRICS & GYNECOLOGY

## 2021-08-06 PROCEDURE — U0005 INFEC AGEN DETEC AMPLI PROBE: HCPCS

## 2021-08-06 PROCEDURE — 700102 HCHG RX REV CODE 250 W/ 637 OVERRIDE(OP): Performed by: OBSTETRICS & GYNECOLOGY

## 2021-08-06 PROCEDURE — 160009 HCHG ANES TIME/MIN: Performed by: OBSTETRICS & GYNECOLOGY

## 2021-08-06 PROCEDURE — 700105 HCHG RX REV CODE 258: Performed by: ANESTHESIOLOGY

## 2021-08-06 PROCEDURE — 160029 HCHG SURGERY MINUTES - 1ST 30 MINS LEVEL 4: Performed by: OBSTETRICS & GYNECOLOGY

## 2021-08-06 PROCEDURE — 87426 SARSCOV CORONAVIRUS AG IA: CPT

## 2021-08-06 PROCEDURE — 700111 HCHG RX REV CODE 636 W/ 250 OVERRIDE (IP): Performed by: ANESTHESIOLOGY

## 2021-08-06 PROCEDURE — A9270 NON-COVERED ITEM OR SERVICE: HCPCS | Performed by: OBSTETRICS & GYNECOLOGY

## 2021-08-06 PROCEDURE — 85025 COMPLETE CBC W/AUTO DIFF WBC: CPT

## 2021-08-06 PROCEDURE — 700102 HCHG RX REV CODE 250 W/ 637 OVERRIDE(OP): Performed by: ANESTHESIOLOGY

## 2021-08-06 PROCEDURE — 160041 HCHG SURGERY MINUTES - EA ADDL 1 MIN LEVEL 4: Performed by: OBSTETRICS & GYNECOLOGY

## 2021-08-06 PROCEDURE — 160002 HCHG RECOVERY MINUTES (STAT): Performed by: OBSTETRICS & GYNECOLOGY

## 2021-08-06 PROCEDURE — U0003 INFECTIOUS AGENT DETECTION BY NUCLEIC ACID (DNA OR RNA); SEVERE ACUTE RESPIRATORY SYNDROME CORONAVIRUS 2 (SARS-COV-2) (CORONAVIRUS DISEASE [COVID-19]), AMPLIFIED PROBE TECHNIQUE, MAKING USE OF HIGH THROUGHPUT TECHNOLOGIES AS DESCRIBED BY CMS-2020-01-R: HCPCS

## 2021-08-06 PROCEDURE — 770002 HCHG ROOM/CARE - OB PRIVATE (112)

## 2021-08-06 PROCEDURE — 160035 HCHG PACU - 1ST 60 MINS PHASE I: Performed by: OBSTETRICS & GYNECOLOGY

## 2021-08-06 RX ORDER — DOCUSATE SODIUM 100 MG/1
100 CAPSULE, LIQUID FILLED ORAL 2 TIMES DAILY PRN
Status: DISCONTINUED | OUTPATIENT
Start: 2021-08-06 | End: 2021-08-08 | Stop reason: HOSPADM

## 2021-08-06 RX ORDER — HYDROMORPHONE HYDROCHLORIDE 1 MG/ML
0.4 INJECTION, SOLUTION INTRAMUSCULAR; INTRAVENOUS; SUBCUTANEOUS
Status: DISCONTINUED | OUTPATIENT
Start: 2021-08-06 | End: 2021-08-07

## 2021-08-06 RX ORDER — HYDROMORPHONE HYDROCHLORIDE 1 MG/ML
0.2 INJECTION, SOLUTION INTRAMUSCULAR; INTRAVENOUS; SUBCUTANEOUS
Status: DISCONTINUED | OUTPATIENT
Start: 2021-08-06 | End: 2021-08-06 | Stop reason: HOSPADM

## 2021-08-06 RX ORDER — MORPHINE SULFATE 0.5 MG/ML
INJECTION, SOLUTION EPIDURAL; INTRATHECAL; INTRAVENOUS PRN
Status: DISCONTINUED | OUTPATIENT
Start: 2021-08-06 | End: 2021-08-06 | Stop reason: SURG

## 2021-08-06 RX ORDER — ONDANSETRON 2 MG/ML
4 INJECTION INTRAMUSCULAR; INTRAVENOUS
Status: DISCONTINUED | OUTPATIENT
Start: 2021-08-06 | End: 2021-08-06 | Stop reason: HOSPADM

## 2021-08-06 RX ORDER — SCOLOPAMINE TRANSDERMAL SYSTEM 1 MG/1
PATCH, EXTENDED RELEASE TRANSDERMAL PRN
Status: DISCONTINUED | OUTPATIENT
Start: 2021-08-06 | End: 2021-08-06 | Stop reason: SURG

## 2021-08-06 RX ORDER — SIMETHICONE 80 MG
80 TABLET,CHEWABLE ORAL 4 TIMES DAILY PRN
Status: DISCONTINUED | OUTPATIENT
Start: 2021-08-06 | End: 2021-08-08 | Stop reason: HOSPADM

## 2021-08-06 RX ORDER — BUPIVACAINE HYDROCHLORIDE 7.5 MG/ML
INJECTION, SOLUTION INTRASPINAL PRN
Status: DISCONTINUED | OUTPATIENT
Start: 2021-08-06 | End: 2021-08-06 | Stop reason: SURG

## 2021-08-06 RX ORDER — MISOPROSTOL 200 UG/1
800 TABLET ORAL
Status: DISCONTINUED | OUTPATIENT
Start: 2021-08-06 | End: 2021-08-08 | Stop reason: HOSPADM

## 2021-08-06 RX ORDER — CEFAZOLIN SODIUM 1 G/3ML
INJECTION, POWDER, FOR SOLUTION INTRAMUSCULAR; INTRAVENOUS PRN
Status: DISCONTINUED | OUTPATIENT
Start: 2021-08-06 | End: 2021-08-06 | Stop reason: SURG

## 2021-08-06 RX ORDER — OXYCODONE HCL 5 MG/5 ML
5 SOLUTION, ORAL ORAL
Status: DISCONTINUED | OUTPATIENT
Start: 2021-08-06 | End: 2021-08-06 | Stop reason: HOSPADM

## 2021-08-06 RX ORDER — SODIUM CHLORIDE, SODIUM GLUCONATE, SODIUM ACETATE, POTASSIUM CHLORIDE AND MAGNESIUM CHLORIDE 526; 502; 368; 37; 30 MG/100ML; MG/100ML; MG/100ML; MG/100ML; MG/100ML
INJECTION, SOLUTION INTRAVENOUS
Status: DISCONTINUED | OUTPATIENT
Start: 2021-08-06 | End: 2021-08-06 | Stop reason: SURG

## 2021-08-06 RX ORDER — SODIUM CHLORIDE, SODIUM LACTATE, POTASSIUM CHLORIDE, CALCIUM CHLORIDE 600; 310; 30; 20 MG/100ML; MG/100ML; MG/100ML; MG/100ML
INJECTION, SOLUTION INTRAVENOUS PRN
Status: DISCONTINUED | OUTPATIENT
Start: 2021-08-06 | End: 2021-08-08 | Stop reason: HOSPADM

## 2021-08-06 RX ORDER — DEXAMETHASONE SODIUM PHOSPHATE 4 MG/ML
INJECTION, SOLUTION INTRA-ARTICULAR; INTRALESIONAL; INTRAMUSCULAR; INTRAVENOUS; SOFT TISSUE PRN
Status: DISCONTINUED | OUTPATIENT
Start: 2021-08-06 | End: 2021-08-06 | Stop reason: SURG

## 2021-08-06 RX ORDER — KETOROLAC TROMETHAMINE 30 MG/ML
30 INJECTION, SOLUTION INTRAMUSCULAR; INTRAVENOUS EVERY 6 HOURS
Status: DISCONTINUED | OUTPATIENT
Start: 2021-08-07 | End: 2021-08-07

## 2021-08-06 RX ORDER — SODIUM CHLORIDE, SODIUM GLUCONATE, SODIUM ACETATE, POTASSIUM CHLORIDE AND MAGNESIUM CHLORIDE 526; 502; 368; 37; 30 MG/100ML; MG/100ML; MG/100ML; MG/100ML; MG/100ML
1500 INJECTION, SOLUTION INTRAVENOUS ONCE
Status: COMPLETED | OUTPATIENT
Start: 2021-08-06 | End: 2021-08-06

## 2021-08-06 RX ORDER — DIPHENHYDRAMINE HYDROCHLORIDE 50 MG/ML
12.5 INJECTION INTRAMUSCULAR; INTRAVENOUS EVERY 6 HOURS PRN
Status: DISCONTINUED | OUTPATIENT
Start: 2021-08-06 | End: 2021-08-07

## 2021-08-06 RX ORDER — CITRIC ACID/SODIUM CITRATE 334-500MG
30 SOLUTION, ORAL ORAL ONCE
Status: COMPLETED | OUTPATIENT
Start: 2021-08-06 | End: 2021-08-06

## 2021-08-06 RX ORDER — ACETAMINOPHEN 500 MG
1000 TABLET ORAL EVERY 6 HOURS
Status: COMPLETED | OUTPATIENT
Start: 2021-08-06 | End: 2021-08-07

## 2021-08-06 RX ORDER — OXYCODONE HYDROCHLORIDE 10 MG/1
10 TABLET ORAL EVERY 4 HOURS PRN
Status: DISCONTINUED | OUTPATIENT
Start: 2021-08-06 | End: 2021-08-07

## 2021-08-06 RX ORDER — ONDANSETRON 2 MG/ML
4 INJECTION INTRAMUSCULAR; INTRAVENOUS EVERY 6 HOURS PRN
Status: DISCONTINUED | OUTPATIENT
Start: 2021-08-06 | End: 2021-08-07

## 2021-08-06 RX ORDER — VITAMIN A ACETATE, BETA CAROTENE, ASCORBIC ACID, CHOLECALCIFEROL, .ALPHA.-TOCOPHEROL ACETATE, DL-, THIAMINE MONONITRATE, RIBOFLAVIN, NIACINAMIDE, PYRIDOXINE HYDROCHLORIDE, FOLIC ACID, CYANOCOBALAMIN, CALCIUM CARBONATE, FERROUS FUMARATE, ZINC OXIDE, CUPRIC OXIDE 3080; 12; 120; 400; 1; 1.84; 3; 20; 22; 920; 25; 200; 27; 10; 2 [IU]/1; UG/1; MG/1; [IU]/1; MG/1; MG/1; MG/1; MG/1; MG/1; [IU]/1; MG/1; MG/1; MG/1; MG/1; MG/1
1 TABLET, FILM COATED ORAL
Status: DISCONTINUED | OUTPATIENT
Start: 2021-08-07 | End: 2021-08-08 | Stop reason: HOSPADM

## 2021-08-06 RX ORDER — DIPHENHYDRAMINE HYDROCHLORIDE 50 MG/ML
12.5 INJECTION INTRAMUSCULAR; INTRAVENOUS EVERY 6 HOURS PRN
Status: DISCONTINUED | OUTPATIENT
Start: 2021-08-06 | End: 2021-08-08 | Stop reason: HOSPADM

## 2021-08-06 RX ORDER — KETOROLAC TROMETHAMINE 30 MG/ML
INJECTION, SOLUTION INTRAMUSCULAR; INTRAVENOUS PRN
Status: DISCONTINUED | OUTPATIENT
Start: 2021-08-06 | End: 2021-08-06 | Stop reason: SURG

## 2021-08-06 RX ORDER — OXYCODONE HYDROCHLORIDE 5 MG/1
5 TABLET ORAL EVERY 4 HOURS PRN
Status: DISCONTINUED | OUTPATIENT
Start: 2021-08-06 | End: 2021-08-07

## 2021-08-06 RX ORDER — HYDROMORPHONE HYDROCHLORIDE 1 MG/ML
0.5 INJECTION, SOLUTION INTRAMUSCULAR; INTRAVENOUS; SUBCUTANEOUS
Status: DISCONTINUED | OUTPATIENT
Start: 2021-08-06 | End: 2021-08-06 | Stop reason: HOSPADM

## 2021-08-06 RX ORDER — METOCLOPRAMIDE HYDROCHLORIDE 5 MG/ML
10 INJECTION INTRAMUSCULAR; INTRAVENOUS ONCE
Status: COMPLETED | OUTPATIENT
Start: 2021-08-06 | End: 2021-08-06

## 2021-08-06 RX ORDER — DIPHENHYDRAMINE HYDROCHLORIDE 50 MG/ML
12.5 INJECTION INTRAMUSCULAR; INTRAVENOUS
Status: DISCONTINUED | OUTPATIENT
Start: 2021-08-06 | End: 2021-08-06 | Stop reason: HOSPADM

## 2021-08-06 RX ORDER — HYDROMORPHONE HYDROCHLORIDE 1 MG/ML
0.2 INJECTION, SOLUTION INTRAMUSCULAR; INTRAVENOUS; SUBCUTANEOUS
Status: DISCONTINUED | OUTPATIENT
Start: 2021-08-06 | End: 2021-08-07

## 2021-08-06 RX ORDER — OXYCODONE HCL 5 MG/5 ML
10 SOLUTION, ORAL ORAL
Status: DISCONTINUED | OUTPATIENT
Start: 2021-08-06 | End: 2021-08-06 | Stop reason: HOSPADM

## 2021-08-06 RX ORDER — MEPERIDINE HYDROCHLORIDE 25 MG/ML
12.5 INJECTION INTRAMUSCULAR; INTRAVENOUS; SUBCUTANEOUS
Status: DISCONTINUED | OUTPATIENT
Start: 2021-08-06 | End: 2021-08-06 | Stop reason: HOSPADM

## 2021-08-06 RX ORDER — OXYTOCIN 10 [USP'U]/ML
INJECTION, SOLUTION INTRAMUSCULAR; INTRAVENOUS PRN
Status: DISCONTINUED | OUTPATIENT
Start: 2021-08-06 | End: 2021-08-06 | Stop reason: SURG

## 2021-08-06 RX ORDER — METHYLERGONOVINE MALEATE 0.2 MG/ML
0.2 INJECTION INTRAVENOUS
Status: DISCONTINUED | OUTPATIENT
Start: 2021-08-06 | End: 2021-08-08 | Stop reason: HOSPADM

## 2021-08-06 RX ORDER — HYDROMORPHONE HYDROCHLORIDE 1 MG/ML
0.4 INJECTION, SOLUTION INTRAMUSCULAR; INTRAVENOUS; SUBCUTANEOUS
Status: DISCONTINUED | OUTPATIENT
Start: 2021-08-06 | End: 2021-08-06 | Stop reason: HOSPADM

## 2021-08-06 RX ORDER — DIPHENHYDRAMINE HYDROCHLORIDE 50 MG/ML
25 INJECTION INTRAMUSCULAR; INTRAVENOUS EVERY 6 HOURS PRN
Status: DISCONTINUED | OUTPATIENT
Start: 2021-08-06 | End: 2021-08-07

## 2021-08-06 RX ORDER — SODIUM CHLORIDE, SODIUM LACTATE, POTASSIUM CHLORIDE, CALCIUM CHLORIDE 600; 310; 30; 20 MG/100ML; MG/100ML; MG/100ML; MG/100ML
INJECTION, SOLUTION INTRAVENOUS CONTINUOUS
Status: DISCONTINUED | OUTPATIENT
Start: 2021-08-06 | End: 2021-08-06 | Stop reason: ALTCHOICE

## 2021-08-06 RX ORDER — HALOPERIDOL 5 MG/ML
1 INJECTION INTRAMUSCULAR
Status: DISCONTINUED | OUTPATIENT
Start: 2021-08-06 | End: 2021-08-06 | Stop reason: HOSPADM

## 2021-08-06 RX ORDER — ONDANSETRON 2 MG/ML
INJECTION INTRAMUSCULAR; INTRAVENOUS PRN
Status: DISCONTINUED | OUTPATIENT
Start: 2021-08-06 | End: 2021-08-06 | Stop reason: SURG

## 2021-08-06 RX ADMIN — KETOROLAC TROMETHAMINE 30 MG: 30 INJECTION, SOLUTION INTRAMUSCULAR at 17:50

## 2021-08-06 RX ADMIN — DEXAMETHASONE SODIUM PHOSPHATE 8 MG: 4 INJECTION, SOLUTION INTRA-ARTICULAR; INTRALESIONAL; INTRAMUSCULAR; INTRAVENOUS; SOFT TISSUE at 17:27

## 2021-08-06 RX ADMIN — FAMOTIDINE 20 MG: 10 INJECTION INTRAVENOUS at 16:44

## 2021-08-06 RX ADMIN — SCOPALAMINE 1 PATCH: 1 PATCH, EXTENDED RELEASE TRANSDERMAL at 17:27

## 2021-08-06 RX ADMIN — CEFAZOLIN 2 G: 330 INJECTION, POWDER, FOR SOLUTION INTRAMUSCULAR; INTRAVENOUS at 16:54

## 2021-08-06 RX ADMIN — OXYTOCIN 1000 ML: 10 INJECTION, SOLUTION INTRAMUSCULAR; INTRAVENOUS at 17:50

## 2021-08-06 RX ADMIN — FENTANYL CITRATE 15 MCG: 50 INJECTION, SOLUTION INTRAMUSCULAR; INTRAVENOUS at 17:00

## 2021-08-06 RX ADMIN — ACETAMINOPHEN 1000 MG: 500 TABLET, FILM COATED ORAL at 20:22

## 2021-08-06 RX ADMIN — METOCLOPRAMIDE 10 MG: 5 INJECTION, SOLUTION INTRAMUSCULAR; INTRAVENOUS at 16:44

## 2021-08-06 RX ADMIN — OXYTOCIN 20 UNITS: 10 INJECTION, SOLUTION INTRAMUSCULAR; INTRAVENOUS at 17:23

## 2021-08-06 RX ADMIN — MORPHINE SULFATE 0.15 MG: 0.5 INJECTION, SOLUTION EPIDURAL; INTRATHECAL; INTRAVENOUS at 17:00

## 2021-08-06 RX ADMIN — ONDANSETRON 4 MG: 2 INJECTION INTRAMUSCULAR; INTRAVENOUS at 17:27

## 2021-08-06 RX ADMIN — PHENYLEPHRINE HYDROCHLORIDE 15 MCG/MIN: 10 INJECTION INTRAVENOUS at 16:58

## 2021-08-06 RX ADMIN — SODIUM CHLORIDE, SODIUM GLUCONATE, SODIUM ACETATE, POTASSIUM CHLORIDE AND MAGNESIUM CHLORIDE 1500 ML: 526; 502; 368; 37; 30 INJECTION, SOLUTION INTRAVENOUS at 15:50

## 2021-08-06 RX ADMIN — BUPIVACAINE HYDROCHLORIDE IN DEXTROSE 1.5 ML: 7.5 INJECTION, SOLUTION SUBARACHNOID at 17:00

## 2021-08-06 RX ADMIN — SODIUM CITRATE AND CITRIC ACID MONOHYDRATE 30 ML: 500; 334 SOLUTION ORAL at 16:44

## 2021-08-06 RX ADMIN — DIPHENHYDRAMINE HYDROCHLORIDE 25 MG: 50 INJECTION, SOLUTION INTRAMUSCULAR; INTRAVENOUS at 20:31

## 2021-08-06 RX ADMIN — SODIUM CHLORIDE, SODIUM GLUCONATE, SODIUM ACETATE, POTASSIUM CHLORIDE AND MAGNESIUM CHLORIDE: 526; 502; 368; 37; 30 INJECTION, SOLUTION INTRAVENOUS at 16:54

## 2021-08-06 RX ADMIN — DOCUSATE SODIUM 100 MG: 100 CAPSULE, LIQUID FILLED ORAL at 20:22

## 2021-08-06 ASSESSMENT — PATIENT HEALTH QUESTIONNAIRE - PHQ9
1. LITTLE INTEREST OR PLEASURE IN DOING THINGS: NOT AT ALL
9. THOUGHTS THAT YOU WOULD BE BETTER OFF DEAD, OR OF HURTING YOURSELF: NOT AT ALL
SUM OF ALL RESPONSES TO PHQ QUESTIONS 1-9: 3
3. TROUBLE FALLING OR STAYING ASLEEP OR SLEEPING TOO MUCH: NOT AT ALL
2. FEELING DOWN, DEPRESSED, IRRITABLE, OR HOPELESS: SEVERAL DAYS
5. POOR APPETITE OR OVEREATING: NOT AT ALL
8. MOVING OR SPEAKING SO SLOWLY THAT OTHER PEOPLE COULD HAVE NOTICED. OR THE OPPOSITE, BEING SO FIGETY OR RESTLESS THAT YOU HAVE BEEN MOVING AROUND A LOT MORE THAN USUAL: NOT AT ALL
4. FEELING TIRED OR HAVING LITTLE ENERGY: SEVERAL DAYS
SUM OF ALL RESPONSES TO PHQ9 QUESTIONS 1 AND 2: 1
7. TROUBLE CONCENTRATING ON THINGS, SUCH AS READING THE NEWSPAPER OR WATCHING TELEVISION: NOT AT ALL
6. FEELING BAD ABOUT YOURSELF - OR THAT YOU ARE A FAILURE OR HAVE LET YOURSELF OR YOUR FAMILY DOWN: SEVERAL DAYS

## 2021-08-06 ASSESSMENT — FIBROSIS 4 INDEX: FIB4 SCORE: 0.54

## 2021-08-06 ASSESSMENT — LIFESTYLE VARIABLES
TOTAL SCORE: 0
HAVE YOU EVER FELT YOU SHOULD CUT DOWN ON YOUR DRINKING: NO
AVERAGE NUMBER OF DAYS PER WEEK YOU HAVE A DRINK CONTAINING ALCOHOL: 0
EVER FELT BAD OR GUILTY ABOUT YOUR DRINKING: NO
HAVE PEOPLE ANNOYED YOU BY CRITICIZING YOUR DRINKING: NO
TOTAL SCORE: 0
ALCOHOL_USE: NO
HOW MANY TIMES IN THE PAST YEAR HAVE YOU HAD 5 OR MORE DRINKS IN A DAY: 0
TOTAL SCORE: 0
ON A TYPICAL DAY WHEN YOU DRINK ALCOHOL HOW MANY DRINKS DO YOU HAVE: 0
EVER HAD A DRINK FIRST THING IN THE MORNING TO STEADY YOUR NERVES TO GET RID OF A HANGOVER: NO
CONSUMPTION TOTAL: NEGATIVE

## 2021-08-06 ASSESSMENT — PAIN DESCRIPTION - PAIN TYPE
TYPE: SURGICAL PAIN

## 2021-08-06 ASSESSMENT — PAIN SCALES - GENERAL: PAIN_LEVEL: 0

## 2021-08-06 NOTE — ANESTHESIA PREPROCEDURE EVALUATION
tucker pregnancy at 41 weeks gestation. Hx of HELLP with prior pregnancy which required emergency  under general anesthesia. She desired TOLAC, but her exam/presentation is not favorable at this time. Therefore, she will have a repeat . She has no other significant medical history or pregnancy complications.      Physical Exam    Airway   Mallampati: II  TM distance: >3 FB  Neck ROM: full       Cardiovascular - normal exam  Rhythm: regular  Rate: normal  (-) murmur     Dental - normal exam           Pulmonary - normal exam  Breath sounds clear to auscultation     Abdominal    Neurological - normal exam                 Anesthesia Plan    ASA 2       Plan - spinal   Neuraxial block will be primary anesthetic                Postoperative Plan: Postoperative administration of opioids is intended.    Pertinent diagnostic labs and testing reviewed    Informed Consent:    Anesthetic plan and risks discussed with patient.

## 2021-08-06 NOTE — H&P
DATE OF ADMISSION:  2021     CHIEF COMPLAINT:  1.  A 41 and 1/7 week gestation.  2.  Previous  section.  3.  Unfavorable cervix.  4.  Persistent occiput posterior position.  5.  Group B streptococcus positive.     HISTORY OF PRESENT ILLNESS:  This 26-year-old lady is  2, para 1.  Her   NEISHA is 2021 making her EGA 41 and 1/7 weeks at the time of the scheduled   repeat  section.  She strongly desired vaginal birth.  She did not   want a scheduled repeat  section, but she has gone 8 days postdates.  She has   a very unfavorable cervix.  Her baby is persistent occiput posterior   position.  I do not think it would be safe or advisable to attempt induction   of labor in this situation. I think the chance of ultimately achieving a   vaginal birth safely is quite small, and the patient understands that   persistent occiput posterior position further reduced the chance of ultimately   ending up with a vaginal birth even if we were successful at cervical   ripening and induction of labor.  She understands that prostaglandins and   prostaglandin analogues are contraindicated with the previous    section.  Risks and benefits of therapy have been discussed.     Prenatal care as above.  Blood type A positive.  Her antepartum group B strep   results were positive.  She was on low dose aspirin to prevent preeclampsia   because of a history of HELLP syndrome with her previous pregnancy, and there   has been no clinical evidence of preeclampsia.  Her total pregnancy weight   gain was 39 pounds.  Cell free DNA screening was reassuring.  Ultrasound 19   weeks revealed normal fetal anatomy, mid fundal placenta.  Ultrasound at 33   weeks revealed normal fetal growth with mid fundal placenta.  She did have   some transient left calf pain in the third trimester with no clinical signs of   DVT.  Ultrasound at 40 and /7 weeks revealed amniotic fluid index 19.5 cm   persistent occiput  posterior position.     OBSTETRICAL HISTORY:  Primary low transverse  section in 2019 at 34   and 6/7 weeks when she presented with preeclampsia and HELLP syndrome.     PAST MEDICAL HISTORY:  Positive for polycystic ovary syndrome, ovarian cysts   and preeclampsia.     ALLERGIES:  No known drug allergies.     PAST SURGICAL HISTORY:  Primary low transverse  2019.     MEDICATIONS:   1.  Prenatal vitamin daily.  2.  Aspirin 81 mg daily.  3.  Antihistamines p.r.n.     FAMILY HISTORY:  Positive for adult onset diabetes in a cousin. Osteoporosis,   breast cancer and hypertension in maternal grandmother.     SOCIAL HISTORY:  She is  to Herman.  She is employed as a "[employer]"   .  She denies alcohol, tobacco or drug consumption.     PHYSICAL EXAMINATION:    VITAL SIGNS:  Afebrile, /86, weight 198 pounds.  HEENT:  Within normal limits.  HEART:  Sounds normal.  LUNGS:  Clear to auscultation.  ABDOMEN:  Fundal height is appropriate.  No hepatosplenomegaly.  PELVIC:  Cervix is soft, but tightly closed, thick and posterior with   nonpalpable presenting part.  EXTREMITIES:  1+ bipedal edema.  Homans' negative.     DIAGNOSES:  1.  A 41 and 1/7 weeks gestation.  2.  Previous  section.  3.  Group B streptococcus positive.  4.  Unfavorable cervix.  5.  Persistent occiput posterior position.     PLAN:  Repeat low transverse  section.        ______________________________  MD HOLLY Rawls/ALLEN    DD:  2021 08:04  DT:  2021 08:28    Job#:  [id number]

## 2021-08-07 LAB
ERYTHROCYTE [DISTWIDTH] IN BLOOD BY AUTOMATED COUNT: 42.5 FL (ref 35.9–50)
HCT VFR BLD AUTO: 36 % (ref 37–47)
HGB BLD-MCNC: 12.4 G/DL (ref 12–16)
MCH RBC QN AUTO: 32.7 PG (ref 27–33)
MCHC RBC AUTO-ENTMCNC: 34.4 G/DL (ref 33.6–35)
MCV RBC AUTO: 95 FL (ref 81.4–97.8)
PLATELET # BLD AUTO: 190 K/UL (ref 164–446)
PMV BLD AUTO: 12.4 FL (ref 9–12.9)
RBC # BLD AUTO: 3.79 M/UL (ref 4.2–5.4)
WBC # BLD AUTO: 18.9 K/UL (ref 4.8–10.8)

## 2021-08-07 PROCEDURE — 36415 COLL VENOUS BLD VENIPUNCTURE: CPT

## 2021-08-07 PROCEDURE — 700102 HCHG RX REV CODE 250 W/ 637 OVERRIDE(OP): Performed by: ANESTHESIOLOGY

## 2021-08-07 PROCEDURE — A9270 NON-COVERED ITEM OR SERVICE: HCPCS | Performed by: OBSTETRICS & GYNECOLOGY

## 2021-08-07 PROCEDURE — 85027 COMPLETE CBC AUTOMATED: CPT

## 2021-08-07 PROCEDURE — 770002 HCHG ROOM/CARE - OB PRIVATE (112)

## 2021-08-07 PROCEDURE — 700111 HCHG RX REV CODE 636 W/ 250 OVERRIDE (IP): Performed by: ANESTHESIOLOGY

## 2021-08-07 PROCEDURE — A9270 NON-COVERED ITEM OR SERVICE: HCPCS | Performed by: ANESTHESIOLOGY

## 2021-08-07 PROCEDURE — 700102 HCHG RX REV CODE 250 W/ 637 OVERRIDE(OP): Performed by: OBSTETRICS & GYNECOLOGY

## 2021-08-07 RX ORDER — ACETAMINOPHEN 325 MG/1
325 TABLET ORAL EVERY 4 HOURS PRN
Status: DISCONTINUED | OUTPATIENT
Start: 2021-08-07 | End: 2021-08-08 | Stop reason: HOSPADM

## 2021-08-07 RX ORDER — ONDANSETRON 4 MG/1
4 TABLET, ORALLY DISINTEGRATING ORAL EVERY 6 HOURS PRN
Status: DISCONTINUED | OUTPATIENT
Start: 2021-08-07 | End: 2021-08-08 | Stop reason: HOSPADM

## 2021-08-07 RX ORDER — METOCLOPRAMIDE HYDROCHLORIDE 5 MG/ML
10 INJECTION INTRAMUSCULAR; INTRAVENOUS EVERY 6 HOURS PRN
Status: DISCONTINUED | OUTPATIENT
Start: 2021-08-07 | End: 2021-08-08 | Stop reason: HOSPADM

## 2021-08-07 RX ORDER — DIPHENHYDRAMINE HYDROCHLORIDE 50 MG/ML
25 INJECTION INTRAMUSCULAR; INTRAVENOUS EVERY 6 HOURS PRN
Status: DISCONTINUED | OUTPATIENT
Start: 2021-08-07 | End: 2021-08-08 | Stop reason: HOSPADM

## 2021-08-07 RX ORDER — DIPHENHYDRAMINE HCL 25 MG
25 TABLET ORAL EVERY 6 HOURS PRN
Status: DISCONTINUED | OUTPATIENT
Start: 2021-08-07 | End: 2021-08-08 | Stop reason: HOSPADM

## 2021-08-07 RX ORDER — OXYCODONE HYDROCHLORIDE 5 MG/1
5 TABLET ORAL EVERY 4 HOURS PRN
Status: DISCONTINUED | OUTPATIENT
Start: 2021-08-07 | End: 2021-08-08 | Stop reason: HOSPADM

## 2021-08-07 RX ORDER — KETOROLAC TROMETHAMINE 30 MG/ML
30 INJECTION, SOLUTION INTRAMUSCULAR; INTRAVENOUS EVERY 6 HOURS
Status: DISCONTINUED | OUTPATIENT
Start: 2021-08-07 | End: 2021-08-07

## 2021-08-07 RX ORDER — OXYCODONE HYDROCHLORIDE 10 MG/1
10 TABLET ORAL EVERY 4 HOURS PRN
Status: DISCONTINUED | OUTPATIENT
Start: 2021-08-07 | End: 2021-08-08 | Stop reason: HOSPADM

## 2021-08-07 RX ORDER — KETOROLAC TROMETHAMINE 30 MG/ML
INJECTION, SOLUTION INTRAMUSCULAR; INTRAVENOUS
Status: DISCONTINUED
Start: 2021-08-07 | End: 2021-08-07

## 2021-08-07 RX ORDER — IBUPROFEN 600 MG/1
600 TABLET ORAL EVERY 6 HOURS PRN
Status: DISCONTINUED | OUTPATIENT
Start: 2021-08-07 | End: 2021-08-08 | Stop reason: HOSPADM

## 2021-08-07 RX ORDER — MORPHINE SULFATE 10 MG/ML
4 INJECTION, SOLUTION INTRAMUSCULAR; INTRAVENOUS
Status: DISCONTINUED | OUTPATIENT
Start: 2021-08-07 | End: 2021-08-08 | Stop reason: HOSPADM

## 2021-08-07 RX ORDER — ONDANSETRON 2 MG/ML
4 INJECTION INTRAMUSCULAR; INTRAVENOUS EVERY 6 HOURS PRN
Status: DISCONTINUED | OUTPATIENT
Start: 2021-08-07 | End: 2021-08-08 | Stop reason: HOSPADM

## 2021-08-07 RX ADMIN — KETOROLAC TROMETHAMINE 30 MG: 30 INJECTION, SOLUTION INTRAMUSCULAR; INTRAVENOUS at 12:01

## 2021-08-07 RX ADMIN — ACETAMINOPHEN 1000 MG: 500 TABLET, FILM COATED ORAL at 14:17

## 2021-08-07 RX ADMIN — DOCUSATE SODIUM 100 MG: 100 CAPSULE, LIQUID FILLED ORAL at 22:18

## 2021-08-07 RX ADMIN — KETOROLAC TROMETHAMINE 30 MG: 30 INJECTION, SOLUTION INTRAMUSCULAR; INTRAVENOUS at 06:05

## 2021-08-07 RX ADMIN — ACETAMINOPHEN 1000 MG: 500 TABLET, FILM COATED ORAL at 02:00

## 2021-08-07 RX ADMIN — ACETAMINOPHEN 1000 MG: 500 TABLET, FILM COATED ORAL at 08:04

## 2021-08-07 RX ADMIN — IBUPROFEN 600 MG: 600 TABLET ORAL at 22:18

## 2021-08-07 RX ADMIN — KETOROLAC TROMETHAMINE 30 MG: 30 INJECTION, SOLUTION INTRAMUSCULAR; INTRAVENOUS at 00:41

## 2021-08-07 RX ADMIN — DOCUSATE SODIUM 100 MG: 100 CAPSULE, LIQUID FILLED ORAL at 06:05

## 2021-08-07 RX ADMIN — PRENATAL WITH FERROUS FUM AND FOLIC ACID 1 TABLET: 3080; 920; 120; 400; 22; 1.84; 3; 20; 10; 1; 12; 200; 27; 25; 2 TABLET ORAL at 08:04

## 2021-08-07 ASSESSMENT — PAIN DESCRIPTION - PAIN TYPE
TYPE: ACUTE PAIN
TYPE: SURGICAL PAIN
TYPE: ACUTE PAIN
TYPE: ACUTE PAIN
TYPE: SURGICAL PAIN
TYPE: ACUTE PAIN
TYPE: ACUTE PAIN

## 2021-08-07 NOTE — ANESTHESIA POSTPROCEDURE EVALUATION
Patient: Chelle Richardson    Procedure Summary     Date: 21 Room / Location: [facility] OR 01 / SURGERY LABOR AND DELIVERY    Anesthesia Start:  Anesthesia Stop:     Procedure:  SECTION, REPEAT (Abdomen) Diagnosis:        delivery delivered      ( section delivered)    Surgeons: Giancarlo Moreno M.D. Responsible Provider: Bobby Lee M.D.    Anesthesia Type: spinal ASA Status: 2          Final Anesthesia Type: spinal  Last vitals  BP   Blood Pressure: 109/59    Temp   36.3 °C (97.3 °F)    Pulse   63   Resp   18    SpO2   93 %      Anesthesia Post Evaluation    Patient location during evaluation: PACU  Patient participation: complete - patient participated  Level of consciousness: awake and alert  Pain score: 0    Airway patency: patent  Anesthetic complications: no  Cardiovascular status: hemodynamically stable  Respiratory status: acceptable  Hydration status: euvolemic    PONV: none          No complications documented.     Nurse Pain Score: 0 (NPRS)

## 2021-08-07 NOTE — LACTATION NOTE
This note was copied from a baby's chart.  Mom filomena 27 y/o P2 who delivered baby girl weighing 8 # 4.3 oz at 41.1 wks. Mom report pumping and providing for first child for about 9 months. Mom did combo feed baby formula and EXBM. Fist baby was in NICU and was given frequent bottles. Upon entry to room mom is visiting with family but LC was able to talk with mom about how this baby latches. She feel baby is doing well and has fed several times since birth. Baby has had two voids as well but no documented stools. NO discomfort noted when latching. Mom states baby hasn't fed for three hours but declined LC assist with feeding at this time. Encouraged to call for assist as needed with breastfeeding. LC will follow up in the morning for evaluation of feed. Reccommended that mom remain STS with baby and be allowed to crawl to breast when noting feeding cues.

## 2021-08-07 NOTE — OR SURGEON
Immediate Post OP Note    PreOp Diagnosis:   1.  A 41 and 1/7 week gestation.  2.  Previous  section.  3.  Unfavorable cervix.  4.  Persistent occiput posterior position.  5.  Group B streptococcus positive.        PostOp Diagnosis:     same      Procedure(s):   SECTION, REPEAT - Wound Class: Clean    Surgeon(s):  ASHLEY Fulton M.D.    Anesthesiologist/Type of Anesthesia:  Anesthesiologist: Bobby Lee M.D./Spinal    Surgical Staff:  Circulator: Dominique Costa R.N.  Scrub Person: Rosa Maria CARLISLE&ERIC Baby  Nurse: Meg Valero R.N.    Specimens removed if any:  * No specimens in log *    Estimated Blood Loss: 600 cc    Findings: baby-male, APGARs 7-8, 3760 grams    Complications: none        2021 6:02 PM Giancarlo Moreno M.D.

## 2021-08-07 NOTE — CARE PLAN
The patient is Stable - Low risk of patient condition declining or worsening    Shift Goals  Clinical Goals: Patient will maintain stable VS; pain will be WDL; patient up to bathroom at 8 hrs post del  VS stable, Patient reports pain at tolerable levels, patient up to bathroom at 0130, 8 hrs post del.   Progress made toward(s) clinical / shift goals:    Problem: Knowledge Deficit - Standard  Goal: Patient and family/care givers will demonstrate understanding of plan of care, disease process/condition, diagnostic tests and medications  Outcome: Progressing     Problem: Pain - Standard  Goal: Alleviation of pain or a reduction in pain to the patient’s comfort goal  Outcome: Progressing     Problem: Skin Integrity  Goal: Skin integrity is maintained or improved  Outcome: Progressing     Problem: Knowledge Deficit - Postpartum  Goal: Patient will verbalize and demonstrate understanding of self and infant care  Outcome: Progressing     Problem: Psychosocial - Postpartum  Goal: Patient will verbalize and demonstrate effective bonding and parenting behavior  Outcome: Progressing     Problem: Altered Physiologic Condition  Goal: Patient physiologically stable as evidenced by normal lochia, palpable uterine involution and vitals within normal limits  Outcome: Progressing     Problem: Infection - Postpartum  Goal: Postpartum patient will be free of signs and symptoms of infection  Outcome: Progressing     Problem: Respiratory/Oxygenation Function Post-Surgical  Goal: Patient will achieve/maintain normal respiratory rate/effort  Outcome: Progressing     Problem: Bowel Elimination - Post Surgical  Goal: Patient will resume regular bowel sounds and function with no discomfort or distention  Outcome: Progressing     Problem: Early Mobilization - Post Surgery  Goal: Early mobilization post surgery  Outcome: Progressing

## 2021-08-07 NOTE — ANESTHESIA PROCEDURE NOTES
Spinal Block    Date/Time: 8/6/2021 4:54 PM  Performed by: Bobby Lee M.D.  Authorized by: Bobby Lee M.D.     Patient Location:  OR  Start Time:  8/6/2021 4:54 PM  End Time:  8/6/2021 5:00 PM  Reason for Block: primary anesthetic    patient identified, IV checked, site marked, risks and benefits discussed, surgical consent, monitors and equipment checked, pre-op evaluation and timeout performed    Patient Position:  Sitting  Prep: ChloraPrep, patient draped and sterile technique    Monitoring:  Blood pressure, continuous pulse oximetry and heart rate  Approach:  Midline  Location:  L3-4  Injection Technique:  Single-shot  Skin infiltration:  Lidocaine  Strength:  1%  Dose:  3ml  Needle Type:  Pencan  Needle Gauge:  25 G  CSF flowing pre/post injection:  Yes  Sensory Level:  T4   Success on 1st pass  No heme  Normal resistance on injection  CSF flowing pre and post injection  No evidence of complications

## 2021-08-07 NOTE — PROGRESS NOTES
1905 Assume care from L&D. Oriented patient to room,call light, and emergancy light. Assessment completed,fundus firm,lochia light. Plan of care reviewed, verbilized understanding. Patient denies pain at this time, will call if intervention needed.  2020 Patient complains of itching all over her body and asked for medication. 25mg Benadryl given.   08/07/2021   0040 Per Gallo LezamaD, first dose of Toradol due at 0000. See Pharm messages.   0130 Oxytocin infusion complete, IV saline locked. Patient up to bathroom, tolerated well. Lochia light, rubra.

## 2021-08-07 NOTE — PROGRESS NOTES
POD 0.5---      Afebrile, VS normal, UO adequate  + flatus, tolerating clear liquids  Well, denies N/V    LAB:     2021 15:20 2021 01:34   WBC 10.9 (H) 18.9 (H)   Hemoglobin 13.5 12.4   Hematocrit 38.6 36.0 (L)   Platelet 205 190     PE:     Lungs clear to auscultation  Abdomen soft, flat, bowel sounds present and normal  Wound dressing in place, waterproof barrier intact, blood spot present on Mepilex  Calves nontender, Sheldon sign negative bilaterally  Back:  No CVA tenderness     PLAN: Postop care, advance diet as tolerated, increase activity as tolerated.

## 2021-08-07 NOTE — ANESTHESIA TIME REPORT
Anesthesia Start and Stop Event Times     Date Time Event    2021 1626 Ready for Procedure     1654 Anesthesia Start     1758 Anesthesia Stop        Responsible Staff  21    Name Role Begin End    Bobby Lee M.D. Anesth 1654 1758        Preop Diagnosis (Free Text):  Pre-op Diagnosis     OCCIPITOPOSTERIOR POSITION 41WK        Preop Diagnosis (Codes):  Diagnosis Information     Diagnosis Code(s):  delivery delivered [O82]        Post op Diagnosis  Previous  section  tucker pregnancy at 41 weeks gestation, hx of prior     Premium Reason  A. 3PM - 7AM    Comments:

## 2021-08-07 NOTE — OP REPORT
DATE OF SERVICE:  2021     OPERATION:  Repeat low transverse  section.     SURGEON:  Giancarlo Moreno MD     ASSISTANT:  Shanna Charles MD     ANESTHESIOLOGIST:  Bobby Lee MD     ANESTHESIA:  Spinal.     PREOPERATIVE DIAGNOSES:  1.  41-1/7 weeks' gestation.  2.  Previous  section.  3.  Unfavorable cervix.  4.  Persistent occiput posterior position.  5.  Group B streptococcus positive.     POSTOPERATIVE DIAGNOSES:  1.  41-1/7 weeks' gestation.  2.  Previous  section.  3.  Unfavorable cervix.  4.  Persistent occiput posterior position.  5.  Group B streptococcus positive.     COMPLICATIONS:  None.     ESTIMATED BLOOD LOSS:  600 mL.     FINDINGS:  Baby--- male, 1 minute Apgar 7, 5 minute Apgar 8, weight 3760 grams.    CORD GASSES:     2021 17:30   Cord Bg Ph 7.11   Cord Bg Pco2 68.2   Cord Bg Base Excess -10   CV Ph 7.20   CV Pco2 59.2   CV Base Excess -7      INDICATIONS:  This 26-year-old lady is now .  She presented 8 days after   her due date for a repeat  section.  She wanted to attempt vaginal   birth, but she never went into labor.  Her cervix is unfavorable for induction   and the baby is persistent occiput posterior by ultrasound.  She is also   known to be group B strep positive. Preop fetal monitoring was reassuring.     DESCRIPTION OF PROCEDURE:  The patient went to the OR.  Ancef 2 grams was   given preoperatively.  She was prepped and draped.  A timeout was done.  I   made a Pfannenstiel skin incision through a preexisting Pfannenstiel scar.  I   incised a very thin layer of subcutaneous fat.  I incised the rectus fascia   transversely.  I  the fascia from the rectus muscles and entered the   peritoneum in the midline well away from the bladder.  The peritoneal incision   was enlarged.  An Durga O retractor was placed.  The lower uterus was   intact.  There were some filmy anterior cul-de-sac adhesions easily freed up   with  scissors.  I made a low transverse myometrial incision.  I pierced the   membranes with a hemostat revealing clear fluid.  The baby's face presented at   the hysterotomy incision and the head was deflexed.  I rotated the baby to   left occiput anterior and flexed the fetal neck. The head delivered with a   significant amount of fundal pressure and manual assistance.  I bulb suctioned   the baby's mouth and nares.  There were no nuchal cords.  The shoulders and   body delivered easily.  Thirty seconds after the delivery, I doubly clamped   and cut the cord and handed the baby off.  Gentle cord traction and fundal   massage produced the intact placenta and all attached membranes.  I made sure   there were no retained products of conception.  I closed the myometrium with   running interlocking 0 Vicryl.  I placed a second imbricating layer of running   0 Vicryl.  The adnexa were inspected.  There were no adnexal masses evident.    After confirming good hemostasis, the Durga O was removed.  I placed   Seprafilm adhesion barrier over the hysterotomy site.  I closed the anterior   parietal peritoneum and the posterior layer of the rectus sheath with running   2-0 Vicryl.  I closed both layers of the rectus abdominis fascia with running   0 PDS.  I closed Geetha's fascia with running 3-0 Vicryl.  I closed the skin   with Insorb resorbable subcutaneous staples, 1/2 inch wide Steri-Strips and   Mepilex Ag dressing were placed.  Sponge and needle counts were correct.        ______________________________  MD HOLLY Rawls/MADELINE    DD:  08/06/2021 18:08  DT:  08/06/2021 18:56    Job#:  [id number]

## 2021-08-07 NOTE — CARE PLAN
The patient is Stable - Low risk of patient condition declining or worsening    Shift Goals  Clinical Goals: pain control   Patient Goals: pain control and infant bonding   Family Goals: infant bonding     Progress made toward(s) clinical / shift goals:      Problem: Knowledge Deficit - Standard  Goal: Patient and family/care givers will demonstrate understanding of plan of care, disease process/condition, diagnostic tests and medications  Outcome: Progressing  Patient updated on plan of care, questions answered, no needs at this time.      Problem: Pain - Standard  Goal: Alleviation of pain or a reduction in pain to the patient’s comfort goal  Outcome: Progressing   Patient tolerating pain medication regimen. Discussed non pharmacologic pain management options as well.     Patient is not progressing towards the following goals:    NA

## 2021-08-07 NOTE — PROGRESS NOTES
1425 - Pt arrived for repeat C/S, to LDA5.  1654 - to OR1  1722 - C/S delivery of a male infant, APGARS 7/8  1754 - to PACU1  1900 - Pt to PP via gurney with FOB and infant and all belongings. Report to KAILA Dempsey. Bands/cuddle verified.

## 2021-08-08 VITALS
BODY MASS INDEX: 32.82 KG/M2 | OXYGEN SATURATION: 96 % | HEART RATE: 54 BPM | WEIGHT: 197 LBS | SYSTOLIC BLOOD PRESSURE: 117 MMHG | DIASTOLIC BLOOD PRESSURE: 65 MMHG | HEIGHT: 65 IN | TEMPERATURE: 97.6 F | RESPIRATION RATE: 18 BRPM

## 2021-08-08 PROBLEM — O34.219 UTERINE SCAR FROM PREVIOUS CESAREAN DELIVERY AFFECTING PREGNANCY: Status: ACTIVE | Noted: 2021-08-08

## 2021-08-08 PROBLEM — B95.1 POSITIVE GBS TEST: Status: ACTIVE | Noted: 2021-08-08

## 2021-08-08 PROBLEM — O48.0 POST-DATES PREGNANCY: Status: ACTIVE | Noted: 2021-08-08

## 2021-08-08 PROBLEM — O34.40 UNFAVORABLE CERVIX IN TERM PREGNANCY: Status: ACTIVE | Noted: 2021-08-08

## 2021-08-08 LAB
SARS-COV-2 RNA RESP QL NAA+PROBE: NOTDETECTED
SPECIMEN SOURCE: NORMAL

## 2021-08-08 PROCEDURE — A9270 NON-COVERED ITEM OR SERVICE: HCPCS | Performed by: OBSTETRICS & GYNECOLOGY

## 2021-08-08 PROCEDURE — 700102 HCHG RX REV CODE 250 W/ 637 OVERRIDE(OP): Performed by: OBSTETRICS & GYNECOLOGY

## 2021-08-08 RX ORDER — SIMETHICONE 80 MG
80 TABLET,CHEWABLE ORAL 4 TIMES DAILY PRN
Qty: 30 TABLET | Refills: 3 | COMMUNITY
Start: 2021-08-08 | End: 2023-07-11

## 2021-08-08 RX ORDER — IBUPROFEN 200 MG
200-600 TABLET ORAL EVERY 6 HOURS PRN
COMMUNITY
Start: 2021-08-08

## 2021-08-08 RX ORDER — PSEUDOEPHEDRINE HCL 30 MG
100 TABLET ORAL 2 TIMES DAILY PRN
Qty: 60 CAPSULE | COMMUNITY
Start: 2021-08-08 | End: 2023-07-11

## 2021-08-08 RX ORDER — ACETAMINOPHEN 325 MG/1
325-650 TABLET ORAL EVERY 6 HOURS PRN
COMMUNITY
Start: 2021-08-08 | End: 2023-07-11

## 2021-08-08 RX ADMIN — PRENATAL WITH FERROUS FUM AND FOLIC ACID 1 TABLET: 3080; 920; 120; 400; 22; 1.84; 3; 20; 10; 1; 12; 200; 27; 25; 2 TABLET ORAL at 07:35

## 2021-08-08 RX ADMIN — IBUPROFEN 600 MG: 600 TABLET ORAL at 07:35

## 2021-08-08 RX ADMIN — DOCUSATE SODIUM 100 MG: 100 CAPSULE, LIQUID FILLED ORAL at 07:35

## 2021-08-08 RX ADMIN — ACETAMINOPHEN 325 MG: 325 TABLET, FILM COATED ORAL at 02:38

## 2021-08-08 ASSESSMENT — EDINBURGH POSTNATAL DEPRESSION SCALE (EPDS)
I HAVE BEEN SO UNHAPPY THAT I HAVE BEEN CRYING: ONLY OCCASIONALLY
I HAVE BEEN ANXIOUS OR WORRIED FOR NO GOOD REASON: HARDLY EVER
I HAVE BLAMED MYSELF UNNECESSARILY WHEN THINGS WENT WRONG: YES, SOME OF THE TIME
THE THOUGHT OF HARMING MYSELF HAS OCCURRED TO ME: NEVER
I HAVE FELT SCARED OR PANICKY FOR NO GOOD REASON: NO, NOT MUCH
I HAVE BEEN ABLE TO LAUGH AND SEE THE FUNNY SIDE OF THINGS: AS MUCH AS I ALWAYS COULD
THINGS HAVE BEEN GETTING ON TOP OF ME: NO, MOST OF THE TIME I HAVE COPED QUITE WELL
I HAVE BEEN SO UNHAPPY THAT I HAVE HAD DIFFICULTY SLEEPING: NOT VERY OFTEN
I HAVE LOOKED FORWARD WITH ENJOYMENT TO THINGS: AS MUCH AS I EVER DID
I HAVE FELT SAD OR MISERABLE: NOT VERY OFTEN

## 2021-08-08 ASSESSMENT — PAIN DESCRIPTION - PAIN TYPE
TYPE: ACUTE PAIN
TYPE: ACUTE PAIN;SURGICAL PAIN

## 2021-08-08 NOTE — DISCHARGE SUMMARY
DATE OF ADMISSION:  2021   DATE OF DISCHARGE:  2021     ADMISSION DIAGNOSES:  1.  A 41 and 1/7 week gestation.  2.  Previous  section.  3.  Unfavorable cervix.  4.  Persistent occiput posterior position.  5.  Group B streptococcus positive.     DISCHARGE DIAGNOSES:  1.  A 41 and 1/7 week gestation.  2.  Previous  section.  3.  Unfavorable cervix.  4.  Persistent occiput posterior position.  5.  Group B streptococcus positive.     PROCEDURE: Repeat low transverse  section.     COMPLICATIONS:  None.     FINDINGS:  Baby--- male, 1 minute Apgar 7, 5-minute Apgar 8, weight 3760 grams.     CORD GASES:  Umbilical artery pH 7.11, pCO2 of 68, base excess -10.  Umbilical   vein pH 7.20, pCO2 of 59.2, base excess -7.    PRESCRIPTIONS:  1.  Prenatal vitamin daily.  2.  Over-the-counter Tylenol 325-650 mg every 6 hours p.r.n. pain.  3.  Over-the-counter ibuprofen 200-600 mg every 6 hours p.r.n. pain.       HOSPITAL COURSE:  This 26-year-old lady is now .  She presented 8 days   after her due date for repeat  section.  She was hoping to achieve   , but she never went into labor and her cervix was unfavorable for   induction with the baby persistent occiput posterior by ultrasound.     was done on the day of admission with no complications.  She was discharged   on postop day #2 in satisfactory condition.  She has been afebrile.  She has   had normal return of bowel function.  There is no evidence of wound infection.    She has not required any opioids postpartum and she does not want a   prescription for opioids.        FOLLOWUP PLAN: Two weeks.        ______________________________  MD HOLLY Rawls/SAM    DD:  2021 07:25  DT:  2021 07:37    Job#:  [id number]

## 2021-08-08 NOTE — DISCHARGE INSTRUCTIONS
PATIENT DISCHARGE EDUCATION INSTRUCTION SHEET  REASONS TO CALL YOUR OBSTETRICIAN  · Persistent fever, shaking, chills (Temperature higher than 100.4) may indicate you have an infection  · Heavy bleeding: soaking more than 1 pad per hour; Passing clots an egg-sized clot or bigger may mean you have an postpartum hemorrhage  · Foul odor from vagina or bad smelling or discolored discharge or blood  · Breast infection (Mastitis symptoms); breast pain, chills, fever, redness or red streaks, may feel flu like symptoms  · Urinary pain, burning or frequency  · Incision that is not healing, increased redness, swelling, tenderness or pain, or any pus from episiotomy or  site may mean you have an infection  · Redness, swelling, warmth, or painful to touch in the calf area of your leg may mean you have a blood clot  · Severe or intensified depression, thoughts or feelings of wanting to hurt yourself or someone else   · Pain in chest, obstructed breathing or shortness of breath (trouble catching your breath) may mean you are having a postpartum complication. Call your provider immediately   · Headache that does not get better, even after taking medicine, a bad headache with vision changes or pain in the upper right area of your belly may mean you have high blood pressure or post birth preeclampsia. Call your provider immediately    HAND WASHING  All family and friends should wash their hands:  · Before and after holding the baby  · Before feeding the baby  · After using the restroom or changing the baby's diaper    WOUND CARE  Ask your physician for additional care instructions. In general:  ·  Incision:  · May shower and pat incision dry   · Keep the incision clean and dry  · There should not be any opening or pus from the incision  · Continue to walk at home 3 times a day   · Do NOT lift anything heavier than your baby (over 10 pounds)  · Encourage family to help participate in care of the  to allow  rest and mom time to heal  · Episiotomy/Laceration  · May use shankar-spray bottle, witch hazel pads and dermaplast spray for comfort  · Use shankar-spray bottle after urinating to cleanse perineal area  · To prevent burning during urination spray shankar-water bottle on labial area   · Pat perineal area dry until episiotomy/laceration is healed  · Continue to use shankar-bottle until bleeding stops as needed  · If have a 2nd degree laceration or greater, a Sitz bath can offer relief from soreness, burning, and inflammation   · Sitz Bath   · Sit in 6 inches of warm water and soak laceration as needed until the laceration heals    VAGINAL CARE AND BLEEDING  · Nothing inside vagina for 6 weeks:   · No sexual intercourse, tampons or douching  · Bleeding may continue for 2-4 weeks. Amount and color may vary  · Soaking 1 pad or more in an hour for several hours is considered heavy bleeding  · Passing large egg sized blood clots can be concerning  · If you feel like you have heavy bleeding or are having increasing amount of blood clots call your Obstetrician immediately  · If you begin feeling faint upon standing, feeling sick to your stomach, have clammy skin, a really fast heartbeat, have chills, start feeling confused, dizzy, sleepy or weak, or feeling like you're going to faint call your Obstetrician immediately    HYPERTENSION   Preeclampsia or gestational hypertension are types of high blood pressure that only pregnant women can get. It is important for you to be aware of symptoms to seek early intervention and treatment. If you have any of these symptoms immediately call your Obstetrician    · Vision changes or blurred vision   · Severe headache or pain that is unrelieved with medication and will not go away  · Persistent pain in upper abdomen or shoulder   · Increased swelling of face, feet, or hands  · Difficulty breathing or shortness of breath at rest  · Urinating less than usual    URINATION AND BOWEL MOVEMENTS  · Eating  "more fiber (bran cereal, fruits, and vegetables) and drinking plenty of fluids will help to avoid constipation  · Urinary frequency and urgency after childbirth is normal  · If you experience any urinary pain, burning or frequency call your provider    BIRTH CONTROL  · It is possible to become pregnant at any time after delivery and while breastfeeding  · Plan to discuss a method of birth control with your physician at your post delivery follow up visit    POSTPARTUM BLUES  During the first few days after birth, you may experience a sense of the \"blues\" which may include impatience, irritability or even crying. These feelings come and go quickly. However, as many as 1 in 10 women experience emotional symptoms known as postpartum depression.     POSTPARTUM DEPRESSION    May start as early as the second or third day after delivery or take several weeks or months to develop. Symptoms of \"blues\" are present, but are more intense: Crying spells; loss of appetite; feelings of hopelessness or loss of control; fear of touching the baby; over concern or no concern at all about the baby; little or no concern about your own appearance/caring for yourself; and/or inability to sleep or excessive sleeping. Contact your Obstetrician if you are experiencing any of these symptoms     PREVENTING SHAKEN BABY  If you are angry or stressed, PUT THE BABY IN THE CRIB, step away, take some deep breaths, and wait until you are calm to care for the baby. DO NOT SHAKE THE BABY. You are not alone, call a supporter for help.  · [facility] 24/7 crisis call line (679-433-2054) or (1-602.322.5389)  · You can also text them, text \"ANSWER\" (450214)      "

## 2021-08-08 NOTE — LACTATION NOTE
This note was copied from a baby's chart.  Follow-up visit, baby in NBN getting circumcision at this time, latch not seen. Mother denies nipple damage or pain with latches, states baby was shallow a couple times (recognizes & re-latches). Mother reports baby is latching & breastfeeding well. Weight loss WNL, 4.26%, couplet to be discharged today.     Mother has [facility], pump paperwork for personal pump provided. Info sheets, Breastfeeding Support, NNB [employer] & Zoom given with review. Encouraged mother to follow-up with [facility] for breastfeeding support.     Breastfeeding plan:  Breastfeed on cue a minimum 8x/24 hours no longer than 4 hours from last feed.

## 2021-08-08 NOTE — PROGRESS NOTES
POD 2---      Pt. Has not needed any opioids!    Afebrile, VS normal, UO adequate  + flatus, tolerating regular diet well, denies N/V    LAB:       2021 15:20 2021 01:34   WBC 10.9 (H) 18.9 (H)   Hemoglobin 13.5 12.4   Hematocrit 38.6 36.0 (L)   Platelet 205 190     PE:     Lungs clear to auscultation  Abdomen soft, flat, bowel sounds present and normal  Wound dressing in place, waterproof barrier intact  Calves nontender, Sheldon sign negative bilaterally  Back:  No CVA tenderness     PLAN: Postop care, analgesia as needed, diet as tolerated,  activity as tolerated. Patient planning on discharge:  today .    Prescriptions:   PNV, OTC tylenol/ibuprofen PRN  Followup plans:   2 wks .

## 2021-08-08 NOTE — PROGRESS NOTES
1955 Patient sleeping, will return later for assessment.   2200 Patient still sleeping, woke for assessment and feeding infant. Assessment completed. Lochia light, fundus firm. Plan of care reviewed. Reports some pain, see MAR for intervention.

## 2021-08-08 NOTE — PROGRESS NOTES
0800 Assumed care of patient, assessment completed. Fundus firm , lochia scant . Denies pain needs at this time. Instructed to call for assistance, pain medication, or with any questions. No further needs identified at this time.    1220 Discharge instructions reviewed with patient. All questions answered, verbalized understanding. Paperwork given to patient, copy signed and placed in chart.

## 2021-10-25 ENCOUNTER — HOSPITAL ENCOUNTER (OUTPATIENT)
Facility: MEDICAL CENTER | Age: 26
End: 2021-10-25
Attending: PHYSICIAN ASSISTANT
Payer: COMMERCIAL

## 2021-10-25 ENCOUNTER — HOSPITAL ENCOUNTER (OUTPATIENT)
Dept: LAB | Facility: MEDICAL CENTER | Age: 26
End: 2021-10-25
Attending: PHYSICIAN ASSISTANT
Payer: COMMERCIAL

## 2021-10-25 PROCEDURE — 87591 N.GONORRHOEAE DNA AMP PROB: CPT

## 2021-10-25 PROCEDURE — 87491 CHLMYD TRACH DNA AMP PROBE: CPT

## 2022-07-10 NOTE — PROGRESS NOTES
CC:  To establish care with new PCP, Irregular periods.    HISTORY OF THE PRESENT ILLNESS: Patient is a 22 y.o. female. This pleasant patient is here today to establish care with new PCP and discuss her medical conditions as mentioned below.    Health Maintenance: Completed      Irregular periods  This is a chronic health problem that is uncontrolled with current medications and lifestyle measures. Was on the hormonal pills, but trying to conceive and is off the medication since last June 2017. Happens once in every 2 months, the flow being moderate only the first 2 days being heavy and ending by 7 th day. Has on and off with Mittelschmerz abdominal pain.     Chronic seasonal allergic rhinitis  This is a chronic health problem that is well controlled with current medications and lifestyle measures. On over-the-counter Claritin.    Pre-conception counseling  Currently on prenatal vitamins. Off the contraceptive pills since 07/2017.    PHQ score 0, BMI within normal limits, no tobacco, no fall injuries    Allergies: Patient has no known allergies.    Current Outpatient Prescriptions Ordered in [facility]   Medication Sig Dispense Refill   • PRENATAL 6.75-0.2 MG Tab Take 6 Tabs by mouth every day. 60 Tab 2     No current Epic-ordered facility-administered medications on file.        Past Medical History:   Diagnosis Date   • Irregular periods    • Seasonal allergies        No past surgical history on file.    Social History   Substance Use Topics   • Smoking status: Never Smoker   • Smokeless tobacco: Never Used   • Alcohol use No       Social History     Social History Narrative   • No narrative on file       Family History   Problem Relation Age of Onset   • No Known Problems Mother    • Cancer Father      Skin    • No Known Problems Sister    • No Known Problems Brother    • Cancer Maternal Grandmother      Breast    • Dementia Maternal Grandfather    • No Known Problems Paternal Grandmother    • Heart Disease Paternal  "Grandfather    • Heart Attack Paternal Grandfather    • No Known Problems Sister    • No Known Problems Sister    • No Known Problems Sister    • No Known Problems Sister    • No Known Problems Brother    • No Known Problems Brother        ROS:     - Constitutional: Negative for fever, chills, unexpected weight change, and fatigue/generalized weakness.     - HEENT: Negative for headaches, vision changes, hearing changes, ear pain, ear discharge, rhinorrhea, sinus congestion, sore throat, and neck pain.      - Respiratory: Negative for cough, sputum production, chest congestion, dyspnea, wheezing, and crackles.      - Cardiovascular: Negative for chest pain, palpitations, orthopnea, and bilateral lower extremity edema.     - Gastrointestinal: Negative for heartburn, nausea, vomiting, abdominal pain, hematochezia, melena, diarrhea, constipation, and greasy/foul-smelling stools.     - Genitourinary: Negative for dysuria, polyuria, hematuria, pyuria, urinary urgency, and urinary incontinence.     - Musculoskeletal: Negative for myalgias, back pain, and joint pain.     - Skin: Negative for rash, itching, cyanotic skin color change.     - Neurological: Negative for dizziness, tingling, tremors, focal sensory deficit, focal weakness and headaches.     - Endo/Heme/Allergies: Does not bruise/bleed easily.     - Psychiatric/Behavioral: Negative for depression, suicidal/homicidal ideation and memory loss.      Did not have any lab work done in the past.    Exam: Blood pressure 122/62, pulse (!) 57, temperature 36.7 °C (98.1 °F), height 1.651 m (5' 5\"), weight 66.7 kg (147 lb), SpO2 92 %. Body mass index is 24.46 kg/m².    General: Normal appearing. No distress.  HEENT: Normocephalic. Eyes conjunctiva clear lids without ptosis, pupils equal and reactive to light accommodation, ears normal shape and contour, canals are clear bilaterally, tympanic membranes are benign, nasal mucosa benign, oropharynx is without erythema, edema or " exudates.   Neck: Supple without JVD or bruit. Thyroid is not enlarged.  Pulmonary: Clear to ausculation.  Normal effort. No rales, ronchi, or wheezing.  Cardiovascular: Regular rate and rhythm without murmur. Carotid and radial pulses are intact and equal bilaterally.  Abdomen: Mild lower abdominal tenderness in both right and left iliac regions. Soft, nondistended. Normal bowel sounds. Liver and spleen are not palpable  Neurologic: Grossly nonfocal  Lymph: No cervical, supraclavicular or axillary lymph nodes are palpable  Skin: Warm and dry.  No obvious lesions.  Musculoskeletal: Normal gait. No extremity cyanosis, clubbing, or edema.  Psych: Normal mood and affect. Alert and oriented x3. Judgment and insight is normal.      Please note that this dictation was created using voice recognition software. I have made every reasonable attempt to correct obvious errors, but I expect that there are errors of grammar and possibly content that I did not discover before finalizing the note.      Assessment/Plan  1. Irregular periods  Given her irregular periods since many years, she was on contraceptive pills. She has stopped taking her contraceptive pills because they're planning for contraception since June 2017.On and off with pelvic tenderness likely Mittelschmerz abdominal pain.    Still have ongoing irregular periods, we will check for possible PCO S, hypothyroidism. Will check for CBC if any anemia.  - US-GYN-PELVIS TRANSVAGINAL; Future  - CBC WITH DIFFERENTIAL; Future  - TSH WITH REFLEX TO FT4; Future    2. Chronic seasonal allergic rhinitis, unspecified trigger  Continue the current over-the-counter Claritin and nasal spray.    3. 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  - PRENATAL 6.75-0.2 MG Tab; Take 6 Tabs by mouth every day.  Dispense: 60 Tab; Refill: 2    4.  Encounter for screening for metabolic disorder  Will do a basic complete metabolic panel to check for any metabolic disorder.  - COMP METABOLIC PANEL; Future    5. Need for vaccination  Patient didn't have a flu shot this year and prefers to have one today.  - INFLUENZA VACCINE QUAD INJ >3Y(PF)       cough

## 2023-04-21 ENCOUNTER — HOSPITAL ENCOUNTER (OUTPATIENT)
Facility: MEDICAL CENTER | Age: 28
End: 2023-04-21
Attending: PHYSICIAN ASSISTANT
Payer: COMMERCIAL

## 2023-04-21 ENCOUNTER — HOSPITAL ENCOUNTER (OUTPATIENT)
Dept: LAB | Facility: MEDICAL CENTER | Age: 28
End: 2023-04-21
Attending: PHYSICIAN ASSISTANT

## 2023-04-21 PROCEDURE — 88175 CYTOPATH C/V AUTO FLUID REDO: CPT

## 2023-04-22 LAB — CYTOLOGY REG CYTOL: NORMAL

## 2023-05-26 ENCOUNTER — TELEPHONE (OUTPATIENT)
Dept: HEALTH INFORMATION MANAGEMENT | Facility: OTHER | Age: 28
End: 2023-05-26
Payer: COMMERCIAL

## 2023-05-26 ENCOUNTER — PATIENT MESSAGE (OUTPATIENT)
Dept: HEALTH INFORMATION MANAGEMENT | Facility: OTHER | Age: 28
End: 2023-05-26

## 2023-07-10 SDOH — ECONOMIC STABILITY: HOUSING INSECURITY: IN THE LAST 12 MONTHS, HOW MANY PLACES HAVE YOU LIVED?: 1

## 2023-07-10 SDOH — ECONOMIC STABILITY: HOUSING INSECURITY
IN THE LAST 12 MONTHS, WAS THERE A TIME WHEN YOU DID NOT HAVE A STEADY PLACE TO SLEEP OR SLEPT IN A SHELTER (INCLUDING NOW)?: NO

## 2023-07-10 SDOH — HEALTH STABILITY: PHYSICAL HEALTH: ON AVERAGE, HOW MANY DAYS PER WEEK DO YOU ENGAGE IN MODERATE TO STRENUOUS EXERCISE (LIKE A BRISK WALK)?: 3 DAYS

## 2023-07-10 SDOH — ECONOMIC STABILITY: INCOME INSECURITY: IN THE LAST 12 MONTHS, WAS THERE A TIME WHEN YOU WERE NOT ABLE TO PAY THE MORTGAGE OR RENT ON TIME?: NO

## 2023-07-10 SDOH — ECONOMIC STABILITY: FOOD INSECURITY: WITHIN THE PAST 12 MONTHS, YOU WORRIED THAT YOUR FOOD WOULD RUN OUT BEFORE YOU GOT MONEY TO BUY MORE.: NEVER TRUE

## 2023-07-10 SDOH — ECONOMIC STABILITY: TRANSPORTATION INSECURITY
IN THE PAST 12 MONTHS, HAS LACK OF RELIABLE TRANSPORTATION KEPT YOU FROM MEDICAL APPOINTMENTS, MEETINGS, WORK OR FROM GETTING THINGS NEEDED FOR DAILY LIVING?: NO

## 2023-07-10 SDOH — ECONOMIC STABILITY: TRANSPORTATION INSECURITY
IN THE PAST 12 MONTHS, HAS LACK OF TRANSPORTATION KEPT YOU FROM MEETINGS, WORK, OR FROM GETTING THINGS NEEDED FOR DAILY LIVING?: NO

## 2023-07-10 SDOH — ECONOMIC STABILITY: FOOD INSECURITY: WITHIN THE PAST 12 MONTHS, THE FOOD YOU BOUGHT JUST DIDN'T LAST AND YOU DIDN'T HAVE MONEY TO GET MORE.: NEVER TRUE

## 2023-07-10 SDOH — ECONOMIC STABILITY: TRANSPORTATION INSECURITY
IN THE PAST 12 MONTHS, HAS THE LACK OF TRANSPORTATION KEPT YOU FROM MEDICAL APPOINTMENTS OR FROM GETTING MEDICATIONS?: NO

## 2023-07-10 SDOH — ECONOMIC STABILITY: INCOME INSECURITY: HOW HARD IS IT FOR YOU TO PAY FOR THE VERY BASICS LIKE FOOD, HOUSING, MEDICAL CARE, AND HEATING?: NOT VERY HARD

## 2023-07-10 SDOH — HEALTH STABILITY: PHYSICAL HEALTH: ON AVERAGE, HOW MANY MINUTES DO YOU ENGAGE IN EXERCISE AT THIS LEVEL?: 30 MIN

## 2023-07-10 SDOH — HEALTH STABILITY: MENTAL HEALTH
STRESS IS WHEN SOMEONE FEELS TENSE, NERVOUS, ANXIOUS, OR CAN'T SLEEP AT NIGHT BECAUSE THEIR MIND IS TROUBLED. HOW STRESSED ARE YOU?: ONLY A LITTLE

## 2023-07-10 ASSESSMENT — LIFESTYLE VARIABLES
HOW MANY STANDARD DRINKS CONTAINING ALCOHOL DO YOU HAVE ON A TYPICAL DAY: PATIENT DOES NOT DRINK
HOW OFTEN DO YOU HAVE SIX OR MORE DRINKS ON ONE OCCASION: NEVER
HOW OFTEN DO YOU HAVE A DRINK CONTAINING ALCOHOL: NEVER
AUDIT-C TOTAL SCORE: 0
SKIP TO QUESTIONS 9-10: 1

## 2023-07-10 ASSESSMENT — SOCIAL DETERMINANTS OF HEALTH (SDOH)
HOW MANY DRINKS CONTAINING ALCOHOL DO YOU HAVE ON A TYPICAL DAY WHEN YOU ARE DRINKING: PATIENT DOES NOT DRINK
HOW OFTEN DO YOU GET TOGETHER WITH FRIENDS OR RELATIVES?: ONCE A WEEK
HOW OFTEN DO YOU HAVE SIX OR MORE DRINKS ON ONE OCCASION: NEVER
IN A TYPICAL WEEK, HOW MANY TIMES DO YOU TALK ON THE PHONE WITH FAMILY, FRIENDS, OR NEIGHBORS?: THREE TIMES A WEEK
HOW OFTEN DO YOU HAVE A DRINK CONTAINING ALCOHOL: NEVER
HOW OFTEN DO YOU ATTEND CHURCH OR RELIGIOUS SERVICES?: MORE THAN 4 TIMES PER YEAR
IN A TYPICAL WEEK, HOW MANY TIMES DO YOU TALK ON THE PHONE WITH FAMILY, FRIENDS, OR NEIGHBORS?: THREE TIMES A WEEK
HOW OFTEN DO YOU ATTEND CHURCH OR RELIGIOUS SERVICES?: MORE THAN 4 TIMES PER YEAR
WITHIN THE PAST 12 MONTHS, YOU WORRIED THAT YOUR FOOD WOULD RUN OUT BEFORE YOU GOT THE MONEY TO BUY MORE: NEVER TRUE
HOW OFTEN DO YOU ATTENT MEETINGS OF THE CLUB OR ORGANIZATION YOU BELONG TO?: MORE THAN 4 TIMES PER YEAR
HOW HARD IS IT FOR YOU TO PAY FOR THE VERY BASICS LIKE FOOD, HOUSING, MEDICAL CARE, AND HEATING?: NOT VERY HARD
DO YOU BELONG TO ANY CLUBS OR ORGANIZATIONS SUCH AS CHURCH GROUPS UNIONS, FRATERNAL OR ATHLETIC GROUPS, OR SCHOOL GROUPS?: YES
DO YOU BELONG TO ANY CLUBS OR ORGANIZATIONS SUCH AS CHURCH GROUPS UNIONS, FRATERNAL OR ATHLETIC GROUPS, OR SCHOOL GROUPS?: YES
HOW OFTEN DO YOU ATTENT MEETINGS OF THE CLUB OR ORGANIZATION YOU BELONG TO?: MORE THAN 4 TIMES PER YEAR
HOW OFTEN DO YOU GET TOGETHER WITH FRIENDS OR RELATIVES?: ONCE A WEEK

## 2023-07-11 ENCOUNTER — OFFICE VISIT (OUTPATIENT)
Dept: MEDICAL GROUP | Facility: PHYSICIAN GROUP | Age: 28
End: 2023-07-11
Payer: COMMERCIAL

## 2023-07-11 VITALS
HEART RATE: 75 BPM | OXYGEN SATURATION: 96 % | BODY MASS INDEX: 26.16 KG/M2 | DIASTOLIC BLOOD PRESSURE: 68 MMHG | RESPIRATION RATE: 15 BRPM | TEMPERATURE: 98.4 F | HEIGHT: 65 IN | WEIGHT: 157 LBS | SYSTOLIC BLOOD PRESSURE: 100 MMHG

## 2023-07-11 DIAGNOSIS — J30.2 CHRONIC SEASONAL ALLERGIC RHINITIS: ICD-10-CM

## 2023-07-11 DIAGNOSIS — Z23 NEED FOR VACCINATION: ICD-10-CM

## 2023-07-11 DIAGNOSIS — F33.2 SEVERE EPISODE OF RECURRENT MAJOR DEPRESSIVE DISORDER, WITHOUT PSYCHOTIC FEATURES (HCC): ICD-10-CM

## 2023-07-11 DIAGNOSIS — D22.9 ATYPICAL MOLE: ICD-10-CM

## 2023-07-11 DIAGNOSIS — Z87.59 HISTORY OF HEMOLYSIS, ELEVATED LIVER ENZYMES, AND LOW PLATELET (HELLP) SYNDROME: ICD-10-CM

## 2023-07-11 DIAGNOSIS — R25.3 TWITCHING: ICD-10-CM

## 2023-07-11 PROBLEM — R45.851 SUICIDAL THOUGHTS: Status: RESOLVED | Noted: 2020-07-28 | Resolved: 2023-07-11

## 2023-07-11 PROCEDURE — 3074F SYST BP LT 130 MM HG: CPT

## 2023-07-11 PROCEDURE — 99213 OFFICE O/P EST LOW 20 MIN: CPT | Mod: 25

## 2023-07-11 PROCEDURE — 90746 HEPB VACCINE 3 DOSE ADULT IM: CPT

## 2023-07-11 PROCEDURE — 3078F DIAST BP <80 MM HG: CPT

## 2023-07-11 PROCEDURE — 90471 IMMUNIZATION ADMIN: CPT

## 2023-07-11 ASSESSMENT — PATIENT HEALTH QUESTIONNAIRE - PHQ9
7. TROUBLE CONCENTRATING ON THINGS, SUCH AS READING THE NEWSPAPER OR WATCHING TELEVISION: NOT AT ALL
2. FEELING DOWN, DEPRESSED, IRRITABLE, OR HOPELESS: NOT AT ALL
6. FEELING BAD ABOUT YOURSELF - OR THAT YOU ARE A FAILURE OR HAVE LET YOURSELF OR YOUR FAMILY DOWN: NOT AL ALL
9. THOUGHTS THAT YOU WOULD BE BETTER OFF DEAD, OR OF HURTING YOURSELF: NOT AT ALL
3. TROUBLE FALLING OR STAYING ASLEEP OR SLEEPING TOO MUCH: NOT AT ALL
1. LITTLE INTEREST OR PLEASURE IN DOING THINGS: NOT AT ALL
SUM OF ALL RESPONSES TO PHQ9 QUESTIONS 1 AND 2: 0
SUM OF ALL RESPONSES TO PHQ QUESTIONS 1-9: 0
8. MOVING OR SPEAKING SO SLOWLY THAT OTHER PEOPLE COULD HAVE NOTICED. OR THE OPPOSITE, BEING SO FIGETY OR RESTLESS THAT YOU HAVE BEEN MOVING AROUND A LOT MORE THAN USUAL: NOT AT ALL
4. FEELING TIRED OR HAVING LITTLE ENERGY: NOT AT ALL
5. POOR APPETITE OR OVEREATING: NOT AT ALL

## 2023-07-11 NOTE — ASSESSMENT & PLAN NOTE
Patient reports that she has been getting some twitching in the right eye for the last month. She switched devices to nighttime mode. She drinks a can of soda today.  Thinks she may not be getting enough water intake. No headaches. No eye pain.

## 2023-07-11 NOTE — PROGRESS NOTES
"CC:   Chief Complaint   Patient presents with    Establish Care     Pt states that eye has been twitching for over a month        HISTORY OF PRESENT ILLNESS: Patient is a 28 y.o. female established patient who presents today to discuss the following problems below:     Chronic seasonal allergic rhinitis  Patient reports that she does have some severe allergies, shew as taking an antihistamine but eyes kept getting dried out with this.     History of hemolysis, elevated liver enzymes, and low platelet (HELLP) syndrome  Patient reports that she had presented to her OB a week prior to her admission due to pain, thought she had shoveled snow. Her BP was mildly elevated, but then a week later she had worsened pain and symptoms. She was not able to eat or move. She presented to ER diagnosed with HELLP syndrome. She was 34 weeks, had to get emergently delivered.     Severe episode of recurrent major depressive disorder (HCC)  Patient reports that it has not been formally diagngosed. She had gotten a dog that initially triggered this, and had a one year old when symptoms were really difficult. She has not tried medications, did do some counseling, somewhat helpful and then did not need it anymore. Has not felt depressed, does have some anxiety with sensory overload. 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     Twitching  Patient reports that she has been getting some twitching in the right eye for the last month. She switched devices to nighttime mode. She drinks a can of soda today.  Thinks she may not be getting enough water intake. No headaches. No eye pain.       Review of Systems: Otherwise negative except for as stated above.      Exam: /68   Pulse 75   Temp 36.9 °C (98.4 °F) (Temporal)   Resp 15   Ht 1.651 m (5' 5\")   Wt 71.2 kg (157 lb)   SpO2 96%  Body mass index is 26.13 kg/m².    Physical Exam  Constitutional:       Appearance: Normal appearance.   Eyes:      " Extraocular Movements: Extraocular movements intact.   Pulmonary:      Effort: Pulmonary effort is normal.   Musculoskeletal:      Cervical back: Normal range of motion.   Skin:     Comments: Enlarging mole over posterior shoulder    Neurological:      General: No focal deficit present.      Mental Status: She is alert and oriented to person, place, and time.   Psychiatric:         Mood and Affect: Mood normal.         Behavior: Behavior normal.         Assessment/Plan:  28 y.o. female with the following -    1. Need for vaccination  - Hepatitis B Vaccine Adult 20+    2. Chronic seasonal allergic rhinitis  Chronic, not at goal.  Recommend over-the-counter Flonase.    3. History of hemolysis, elevated liver enzymes, and low platelet (HELLP) syndrome    4. Severe episode of recurrent major depressive disorder, without psychotic features (HCC)  Resolved, situational.  Actively trying to conceive, counseled on risk for developing peripartum depression or anxiety, patient verbalized understanding    5. Twitching  Acute problem, uncomplicated.  Recommend starting on magnesium threonate supplement, 400mg QHS and increase water intake.     6. Atypical mole  Acute problem, mole is enlarging over left shoulder. Hx of skin cancer in family   - Referral to Dermatology      Follow-up: Return in about 1 year (around 7/11/2024).    Health Maintenance: Completed      Please note that this dictation was created using voice recognition software. I have made every reasonable attempt to correct obvious errors, but I expect that there are errors of grammar and possibly content that I did not discover before finalizing the note.    Electronically signed by YEE Whitman on July 11, 2023

## 2023-07-11 NOTE — ASSESSMENT & PLAN NOTE
Patient reports that she does have some severe allergies, shew as taking an antihistamine but eyes kept getting dried out with this.

## 2023-07-11 NOTE — ASSESSMENT & PLAN NOTE
Patient reports that she had presented to her OB a week prior to her admission due to pain, thought she had shoveled snow. Her BP was mildly elevated, but then a week later she had worsened pain and symptoms. She was not able to eat or move. She presented to ER diagnosed with HELLP syndrome. She was 34 weeks, had to get emergently delivered.

## 2023-07-11 NOTE — ASSESSMENT & PLAN NOTE
Patient reports that it has not been formally diagngosed. She had gotten a dog that initially triggered this, and had a one year old when symptoms were really difficult. She has not tried medications, did do some counseling, somewhat helpful and then did not need it anymore. Has not felt depressed, does have some anxiety with sensory overload. 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

## 2023-09-12 ENCOUNTER — OFFICE VISIT (OUTPATIENT)
Dept: DERMATOLOGY | Facility: IMAGING CENTER | Age: 28
End: 2023-09-12
Payer: COMMERCIAL

## 2023-09-12 DIAGNOSIS — D18.01 CHERRY ANGIOMA: ICD-10-CM

## 2023-09-12 DIAGNOSIS — D22.9 MULTIPLE NEVI: ICD-10-CM

## 2023-09-12 DIAGNOSIS — L81.4 LENTIGINES: ICD-10-CM

## 2023-09-12 PROCEDURE — 99212 OFFICE O/P EST SF 10 MIN: CPT | Performed by: NURSE PRACTITIONER

## 2023-09-12 NOTE — PROGRESS NOTES
DERMATOLOGY NOTE  NEW VISIT       Chief complaint: Establish Care     HPI/location: Bilateral shoulders  Time present: Unknown  Painful lesion: No  Itching lesion: No  Enlarging lesion: Yes raised      History of skin cancer: No  History of precancers/actinic keratoses: No  History of biopsies:No  History of blistering/severe sunburns:Yes, Details: Teens  Family history of skin cancer:Yes, Details: Paternal grandfather unknown type  Family history of atypical moles:Unknown      No Known Allergies     MEDICATIONS:  Medications relevant to specialty reviewed.     REVIEW OF SYSTEMS:   Positive for skin (see HPI)  Negative for fevers and chills       EXAM:  There were no vitals taken for this visit.  Constitutional: Well-developed, well-nourished, and in no distress.     A focused skin exam was performed including the affected areas of the bilateral shoulders. Notable findings on exam today listed below and/or in assessment/plan.     Multiple light brown medium brown skin-colored macules papules scattered over the back--All with benign-appearing pigment network patterns on dermoscopy      IMPRESSION / PLAN:    - Benign-appearing nature of lesions discussed during exam.   - pt would like 2 nevi removed on upper back for cosmesis, discussed procedure and associated office fee, pt can schedule at her convenience  - Advised to continue to monitor for any return to clinic for new or concerning changes.    Patient verbalized understanding and agrees with plan regarding the above        Please note that this dictation was created using voice recognition software. I have made every reasonable attempt to correct obvious errors, but I expect that there are errors of grammar and possibly content that I did not discover before finalizing the note.      Return to clinic in: Return for PRN, cosmetic removal of nevi at pt's convenience. and as needed for any new or changing skin lesions.

## 2024-02-14 ENCOUNTER — HOSPITAL ENCOUNTER (OUTPATIENT)
Dept: LAB | Facility: MEDICAL CENTER | Age: 29
End: 2024-02-14
Attending: OBSTETRICS & GYNECOLOGY
Payer: COMMERCIAL

## 2024-02-14 PROCEDURE — 87491 CHLMYD TRACH DNA AMP PROBE: CPT

## 2024-02-14 PROCEDURE — 87591 N.GONORRHOEAE DNA AMP PROB: CPT

## 2024-02-21 ENCOUNTER — HOSPITAL ENCOUNTER (OUTPATIENT)
Dept: LAB | Facility: MEDICAL CENTER | Age: 29
End: 2024-02-21
Attending: OBSTETRICS & GYNECOLOGY
Payer: COMMERCIAL

## 2024-02-21 LAB
ABO GROUP BLD: NORMAL
BASOPHILS # BLD AUTO: 0.4 % (ref 0–1.8)
BASOPHILS # BLD: 0.03 K/UL (ref 0–0.12)
BLD GP AB SCN SERPL QL: NORMAL
EOSINOPHIL # BLD AUTO: 0.04 K/UL (ref 0–0.51)
EOSINOPHIL NFR BLD: 0.5 % (ref 0–6.9)
ERYTHROCYTE [DISTWIDTH] IN BLOOD BY AUTOMATED COUNT: 42 FL (ref 35.9–50)
HBV SURFACE AG SER QL: NORMAL
HCT VFR BLD AUTO: 37.8 % (ref 37–47)
HCV AB SER QL: NORMAL
HGB BLD-MCNC: 13.2 G/DL (ref 12–16)
HIV 1+2 AB+HIV1 P24 AG SERPL QL IA: NORMAL
IMM GRANULOCYTES # BLD AUTO: 0.03 K/UL (ref 0–0.11)
IMM GRANULOCYTES NFR BLD AUTO: 0.4 % (ref 0–0.9)
LYMPHOCYTES # BLD AUTO: 1.52 K/UL (ref 1–4.8)
LYMPHOCYTES NFR BLD: 19.8 % (ref 22–41)
MCH RBC QN AUTO: 31.4 PG (ref 27–33)
MCHC RBC AUTO-ENTMCNC: 34.9 G/DL (ref 32.2–35.5)
MCV RBC AUTO: 89.8 FL (ref 81.4–97.8)
MONOCYTES # BLD AUTO: 0.43 K/UL (ref 0–0.85)
MONOCYTES NFR BLD AUTO: 5.6 % (ref 0–13.4)
NEUTROPHILS # BLD AUTO: 5.63 K/UL (ref 1.82–7.42)
NEUTROPHILS NFR BLD: 73.3 % (ref 44–72)
NRBC # BLD AUTO: 0 K/UL
NRBC BLD-RTO: 0 /100 WBC (ref 0–0.2)
PLATELET # BLD AUTO: 260 K/UL (ref 164–446)
PMV BLD AUTO: 10.4 FL (ref 9–12.9)
RBC # BLD AUTO: 4.21 M/UL (ref 4.2–5.4)
RH BLD: NORMAL
RUBV AB SER QL: 98.2 IU/ML
T PALLIDUM AB SER QL IA: NORMAL
WBC # BLD AUTO: 7.7 K/UL (ref 4.8–10.8)

## 2024-02-21 PROCEDURE — 87086 URINE CULTURE/COLONY COUNT: CPT

## 2024-02-21 PROCEDURE — 86803 HEPATITIS C AB TEST: CPT

## 2024-02-21 PROCEDURE — 86780 TREPONEMA PALLIDUM: CPT

## 2024-02-21 PROCEDURE — 87389 HIV-1 AG W/HIV-1&-2 AB AG IA: CPT

## 2024-02-21 PROCEDURE — 86901 BLOOD TYPING SEROLOGIC RH(D): CPT

## 2024-02-21 PROCEDURE — 86762 RUBELLA ANTIBODY: CPT

## 2024-02-21 PROCEDURE — 86900 BLOOD TYPING SEROLOGIC ABO: CPT

## 2024-02-21 PROCEDURE — 86850 RBC ANTIBODY SCREEN: CPT

## 2024-02-21 PROCEDURE — 85025 COMPLETE CBC W/AUTO DIFF WBC: CPT

## 2024-02-21 PROCEDURE — 87340 HEPATITIS B SURFACE AG IA: CPT

## 2024-02-23 LAB
BACTERIA UR CULT: NORMAL
SIGNIFICANT IND 70042: NORMAL
SITE SITE: NORMAL
SOURCE SOURCE: NORMAL

## 2024-03-19 ENCOUNTER — HOSPITAL ENCOUNTER (OUTPATIENT)
Facility: MEDICAL CENTER | Age: 29
End: 2024-03-19
Attending: OBSTETRICS & GYNECOLOGY
Payer: COMMERCIAL

## 2024-03-19 PROCEDURE — 82105 ALPHA-FETOPROTEIN SERUM: CPT

## 2024-03-21 LAB
# FETUSES US: NORMAL
AFP MOM SERPL: 0.51
AFP SERPL-MCNC: 15 NG/ML
AGE - REPORTED: 31.4 YR
CURRENT SMOKER: NORMAL
FAMILY MEMBER DISEASES HX: NO
GA METHOD: NORMAL
GA: NORMAL WK
IDDM PATIENT QL: NO
INTEGRATED SCN PATIENT-IMP: NORMAL
SPECIMEN DRAWN SERPL: NORMAL

## 2024-05-14 ENCOUNTER — HOSPITAL ENCOUNTER (OUTPATIENT)
Facility: MEDICAL CENTER | Age: 29
End: 2024-05-14
Attending: OBSTETRICS & GYNECOLOGY
Payer: COMMERCIAL

## 2024-05-14 LAB
GLUCOSE 1H P 50 G GLC PO SERPL-MCNC: 85 MG/DL (ref 70–139)
HCT VFR BLD AUTO: 36.9 % (ref 37–47)
HGB BLD-MCNC: 12.3 G/DL (ref 12–16)
PLATELET # BLD AUTO: 248 K/UL (ref 164–446)
T PALLIDUM AB SER QL IA: NORMAL

## 2024-07-15 ENCOUNTER — HOSPITAL ENCOUNTER (OUTPATIENT)
Facility: MEDICAL CENTER | Age: 29
End: 2024-07-15
Attending: OBSTETRICS & GYNECOLOGY
Payer: COMMERCIAL

## 2024-07-15 LAB
AMORPH CRY #/AREA URNS HPF: PRESENT /HPF
APPEARANCE UR: ABNORMAL
BACTERIA #/AREA URNS HPF: ABNORMAL /HPF
BILIRUB UR QL STRIP.AUTO: NEGATIVE
CAOX CRY #/AREA URNS HPF: ABNORMAL /HPF
COLOR UR: ABNORMAL
EPI CELLS #/AREA URNS HPF: ABNORMAL /HPF
GLUCOSE UR STRIP.AUTO-MCNC: NEGATIVE MG/DL
KETONES UR STRIP.AUTO-MCNC: ABNORMAL MG/DL
LEUKOCYTE ESTERASE UR QL STRIP.AUTO: NEGATIVE
MICRO URNS: ABNORMAL
NITRITE UR QL STRIP.AUTO: NEGATIVE
PH UR STRIP.AUTO: 5.5 [PH] (ref 5–8)
PROT UR QL STRIP: NEGATIVE MG/DL
RBC # URNS HPF: ABNORMAL /HPF
RBC UR QL AUTO: NEGATIVE
SP GR UR STRIP.AUTO: 1.03
UROBILINOGEN UR STRIP.AUTO-MCNC: 1 MG/DL
WBC #/AREA URNS HPF: ABNORMAL /HPF

## 2024-07-15 PROCEDURE — 81001 URINALYSIS AUTO W/SCOPE: CPT

## 2024-08-06 ENCOUNTER — APPOINTMENT (OUTPATIENT)
Dept: ADMISSIONS | Facility: MEDICAL CENTER | Age: 29
End: 2024-08-06
Attending: OBSTETRICS & GYNECOLOGY
Payer: COMMERCIAL

## 2024-08-07 ENCOUNTER — HOSPITAL ENCOUNTER (OUTPATIENT)
Facility: MEDICAL CENTER | Age: 29
End: 2024-08-07
Attending: OBSTETRICS & GYNECOLOGY
Payer: COMMERCIAL

## 2024-08-07 PROCEDURE — 87081 CULTURE SCREEN ONLY: CPT

## 2024-08-07 PROCEDURE — 87150 DNA/RNA AMPLIFIED PROBE: CPT

## 2024-08-09 ENCOUNTER — PRE-ADMISSION TESTING (OUTPATIENT)
Dept: ADMISSIONS | Facility: MEDICAL CENTER | Age: 29
End: 2024-08-09
Attending: OBSTETRICS & GYNECOLOGY
Payer: COMMERCIAL

## 2024-08-09 RX ORDER — ASPIRIN 81 MG/1
81 TABLET, CHEWABLE ORAL DAILY
Status: ON HOLD | COMMUNITY
End: 2024-09-08

## 2024-08-10 LAB — GP B STREP DNA SPEC QL NAA+PROBE: POSITIVE

## 2024-08-30 NOTE — H&P
"DATE OF ADMISSION:  2024     For  next Friday, 2024     CHIEF COMPLAINT:  1.  A 39 and 6/7th week gestation.  2.  Two previous  sections.     HISTORY OF PRESENT ILLNESS:  The patient is a 29-year-old lady,  3,   para 2.  Her NEISHA is 2024, so her EGA is 39 and 6/7th weeks at the time   of the scheduled repeat  section.  The indication for surgery is 2   previous  sections.  She declines sterilization.  Risks and benefits   have been discussed.     PRENATAL CARE:  Her blood type is A positive.  Cell-free DNA   screening was reassuring.  She declined recessive gene screening.  Maternal   serum alpha-fetoprotein was reassuring.  Gestational diabetes screen was   normal.  Ultrasound at 19 weeks revealed normal fetal anatomy with posterior   fundal placenta.  Ultrasound at 33 weeks revealed shantel breech presentation,   normal fetal growth and amniotic fluid volume.  She is Group B streptococcus   positive.  All of her blood pressures were normal.  She was on prophylactic   low dose aspirin because of a history of preeclampsia with her first   pregnancy.  Her total pregnancy weight gain was 42 pounds.     OBSTETRIC HISTORY:  1.  Primary low transverse  section at 34 and 6/7th weeks, 2019.    Five-pound 3 ounce baby girl.  She presented with severe preeclampsia and   HELLP syndrome.  2.  Repeat low transverse  section, 2021 at 41 and 1/7th weeks,   3760 gram baby boy, persistent occiput posterior position.  3.  Present pregnancy.     PAST MEDICAL HISTORY:  Positive for HELLP syndrome in 2019.  She has a history   of polycystic ovary syndrome.     PAST SURGICAL HISTORY:  1.  Primary  in 2019.  2.  Repeat  section 2021.     MEDICATIONS:  1.  Prenatal vitamin daily.  2.  Aspirin 81 mg a day.     FAMILY HISTORY:  Her mother  shortly after surgery for \"brain and neck   tumors.\"  Grandparents have been treated for chronic " hypertension, breast   cancer and osteoporosis.  Both her father and sister have chronic   hypertension.  Her father had deep vein thrombosis.     SOCIAL HISTORY:  She is a self-employed .   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  She denies alcohol, tobacco or   drug use.     PHYSICAL EXAMINATION:  VITAL SIGNS:  Afebrile, /68, weight 201 pounds.  HEENT:  Normal.  LUNGS:  Clear to auscultation.  HEART:  Sounds normal.  ABDOMEN:  Nontender, no masses, no HS.  No hepatosplenomegaly.  Uterus is   nontender.  EXTREMITIES:  No edema.  Homans' negative.  NEUROLOGIC:  DTRs normal.  PELVIC:  Not indicated.     DIAGNOSES:  1.  39 at 6/7th week gestation.  2.  Two previous  sections.  3.  Group B streptococcus positive.     PLAN:  Repeat low transverse  section.        ______________________________  MD HOLLY Rwals/ISIDRO/LUCIA    DD:  2024 14:10  DT:  2024 14:49    Job#:  [id number]

## 2024-09-06 ENCOUNTER — ANESTHESIA EVENT (OUTPATIENT)
Dept: OBGYN | Facility: MEDICAL CENTER | Age: 29
End: 2024-09-06
Payer: COMMERCIAL

## 2024-09-06 ENCOUNTER — HOSPITAL ENCOUNTER (INPATIENT)
Facility: MEDICAL CENTER | Age: 29
LOS: 2 days | End: 2024-09-08
Attending: OBSTETRICS & GYNECOLOGY | Admitting: OBSTETRICS & GYNECOLOGY
Payer: COMMERCIAL

## 2024-09-06 ENCOUNTER — ANESTHESIA (OUTPATIENT)
Dept: OBGYN | Facility: MEDICAL CENTER | Age: 29
End: 2024-09-06
Payer: COMMERCIAL

## 2024-09-06 DIAGNOSIS — G89.18 POSTOPERATIVE PAIN: ICD-10-CM

## 2024-09-06 DIAGNOSIS — Z31.69 PRE-CONCEPTION COUNSELING: ICD-10-CM

## 2024-09-06 LAB
BASOPHILS # BLD AUTO: 0.2 % (ref 0–1.8)
BASOPHILS # BLD: 0.02 K/UL (ref 0–0.12)
EOSINOPHIL # BLD AUTO: 0.07 K/UL (ref 0–0.51)
EOSINOPHIL NFR BLD: 0.8 % (ref 0–6.9)
ERYTHROCYTE [DISTWIDTH] IN BLOOD BY AUTOMATED COUNT: 42 FL (ref 35.9–50)
ERYTHROCYTE [DISTWIDTH] IN BLOOD BY AUTOMATED COUNT: 43 FL (ref 35.9–50)
HCT VFR BLD AUTO: 31.6 % (ref 37–47)
HCT VFR BLD AUTO: 36.6 % (ref 37–47)
HGB BLD-MCNC: 10.9 G/DL (ref 12–16)
HGB BLD-MCNC: 12.8 G/DL (ref 12–16)
HOLDING TUBE BB 8507: NORMAL
IMM GRANULOCYTES # BLD AUTO: 0.03 K/UL (ref 0–0.11)
IMM GRANULOCYTES NFR BLD AUTO: 0.4 % (ref 0–0.9)
LYMPHOCYTES # BLD AUTO: 1.98 K/UL (ref 1–4.8)
LYMPHOCYTES NFR BLD: 23.3 % (ref 22–41)
MCH RBC QN AUTO: 31.9 PG (ref 27–33)
MCH RBC QN AUTO: 31.9 PG (ref 27–33)
MCHC RBC AUTO-ENTMCNC: 34.5 G/DL (ref 32.2–35.5)
MCHC RBC AUTO-ENTMCNC: 35 G/DL (ref 32.2–35.5)
MCV RBC AUTO: 91.3 FL (ref 81.4–97.8)
MCV RBC AUTO: 92.4 FL (ref 81.4–97.8)
MONOCYTES # BLD AUTO: 0.52 K/UL (ref 0–0.85)
MONOCYTES NFR BLD AUTO: 6.1 % (ref 0–13.4)
NEUTROPHILS # BLD AUTO: 5.89 K/UL (ref 1.82–7.42)
NEUTROPHILS NFR BLD: 69.2 % (ref 44–72)
NRBC # BLD AUTO: 0 K/UL
NRBC BLD-RTO: 0 /100 WBC (ref 0–0.2)
PLATELET # BLD AUTO: 192 K/UL (ref 164–446)
PLATELET # BLD AUTO: 229 K/UL (ref 164–446)
PMV BLD AUTO: 11.2 FL (ref 9–12.9)
PMV BLD AUTO: 11.4 FL (ref 9–12.9)
RBC # BLD AUTO: 3.42 M/UL (ref 4.2–5.4)
RBC # BLD AUTO: 4.01 M/UL (ref 4.2–5.4)
T PALLIDUM AB SER QL IA: NORMAL
WBC # BLD AUTO: 13.7 K/UL (ref 4.8–10.8)
WBC # BLD AUTO: 8.5 K/UL (ref 4.8–10.8)

## 2024-09-06 PROCEDURE — 770002 HCHG ROOM/CARE - OB PRIVATE (112)

## 2024-09-06 PROCEDURE — 700105 HCHG RX REV CODE 258: Performed by: OBSTETRICS & GYNECOLOGY

## 2024-09-06 PROCEDURE — 160029 HCHG SURGERY MINUTES - 1ST 30 MINS LEVEL 4: Performed by: OBSTETRICS & GYNECOLOGY

## 2024-09-06 PROCEDURE — 700105 HCHG RX REV CODE 258: Performed by: ANESTHESIOLOGY

## 2024-09-06 PROCEDURE — 700111 HCHG RX REV CODE 636 W/ 250 OVERRIDE (IP): Performed by: ANESTHESIOLOGY

## 2024-09-06 PROCEDURE — 700102 HCHG RX REV CODE 250 W/ 637 OVERRIDE(OP): Performed by: ANESTHESIOLOGY

## 2024-09-06 PROCEDURE — 86780 TREPONEMA PALLIDUM: CPT

## 2024-09-06 PROCEDURE — 85025 COMPLETE CBC W/AUTO DIFF WBC: CPT

## 2024-09-06 PROCEDURE — A9270 NON-COVERED ITEM OR SERVICE: HCPCS | Performed by: ANESTHESIOLOGY

## 2024-09-06 PROCEDURE — 160035 HCHG PACU - 1ST 60 MINS PHASE I: Performed by: OBSTETRICS & GYNECOLOGY

## 2024-09-06 PROCEDURE — 700111 HCHG RX REV CODE 636 W/ 250 OVERRIDE (IP): Mod: JZ | Performed by: OBSTETRICS & GYNECOLOGY

## 2024-09-06 PROCEDURE — 700111 HCHG RX REV CODE 636 W/ 250 OVERRIDE (IP): Mod: JZ | Performed by: ANESTHESIOLOGY

## 2024-09-06 PROCEDURE — 700102 HCHG RX REV CODE 250 W/ 637 OVERRIDE(OP): Performed by: OBSTETRICS & GYNECOLOGY

## 2024-09-06 PROCEDURE — 160009 HCHG ANES TIME/MIN: Performed by: OBSTETRICS & GYNECOLOGY

## 2024-09-06 PROCEDURE — 160041 HCHG SURGERY MINUTES - EA ADDL 1 MIN LEVEL 4: Performed by: OBSTETRICS & GYNECOLOGY

## 2024-09-06 PROCEDURE — 85027 COMPLETE CBC AUTOMATED: CPT

## 2024-09-06 PROCEDURE — 36415 COLL VENOUS BLD VENIPUNCTURE: CPT

## 2024-09-06 PROCEDURE — 160002 HCHG RECOVERY MINUTES (STAT): Performed by: OBSTETRICS & GYNECOLOGY

## 2024-09-06 PROCEDURE — C1755 CATHETER, INTRASPINAL: HCPCS | Performed by: OBSTETRICS & GYNECOLOGY

## 2024-09-06 PROCEDURE — A9270 NON-COVERED ITEM OR SERVICE: HCPCS | Performed by: OBSTETRICS & GYNECOLOGY

## 2024-09-06 PROCEDURE — 160048 HCHG OR STATISTICAL LEVEL 1-5: Performed by: OBSTETRICS & GYNECOLOGY

## 2024-09-06 RX ORDER — OXYTOCIN 10 [USP'U]/ML
INJECTION, SOLUTION INTRAMUSCULAR; INTRAVENOUS PRN
Status: DISCONTINUED | OUTPATIENT
Start: 2024-09-06 | End: 2024-09-06 | Stop reason: SURG

## 2024-09-06 RX ORDER — KETOROLAC TROMETHAMINE 15 MG/ML
15 INJECTION, SOLUTION INTRAMUSCULAR; INTRAVENOUS EVERY 6 HOURS
Status: COMPLETED | OUTPATIENT
Start: 2024-09-06 | End: 2024-09-07

## 2024-09-06 RX ORDER — METOCLOPRAMIDE HYDROCHLORIDE 5 MG/ML
10 INJECTION INTRAMUSCULAR; INTRAVENOUS ONCE
Status: COMPLETED | OUTPATIENT
Start: 2024-09-06 | End: 2024-09-06

## 2024-09-06 RX ORDER — HALOPERIDOL 5 MG/ML
1 INJECTION INTRAMUSCULAR
Status: DISCONTINUED | OUTPATIENT
Start: 2024-09-06 | End: 2024-09-06 | Stop reason: HOSPADM

## 2024-09-06 RX ORDER — EPHEDRINE SULFATE 50 MG/ML
10 INJECTION, SOLUTION INTRAVENOUS
Status: ACTIVE | OUTPATIENT
Start: 2024-09-06 | End: 2024-09-07

## 2024-09-06 RX ORDER — HYDRALAZINE HYDROCHLORIDE 20 MG/ML
5 INJECTION INTRAMUSCULAR; INTRAVENOUS
Status: DISCONTINUED | OUTPATIENT
Start: 2024-09-06 | End: 2024-09-06 | Stop reason: HOSPADM

## 2024-09-06 RX ORDER — HYDROMORPHONE HYDROCHLORIDE 1 MG/ML
0.1 INJECTION, SOLUTION INTRAMUSCULAR; INTRAVENOUS; SUBCUTANEOUS
Status: DISCONTINUED | OUTPATIENT
Start: 2024-09-06 | End: 2024-09-06 | Stop reason: HOSPADM

## 2024-09-06 RX ORDER — SCOLOPAMINE TRANSDERMAL SYSTEM 1 MG/1
PATCH, EXTENDED RELEASE TRANSDERMAL PRN
Status: DISCONTINUED | OUTPATIENT
Start: 2024-09-06 | End: 2024-09-06 | Stop reason: SURG

## 2024-09-06 RX ORDER — DIPHENHYDRAMINE HYDROCHLORIDE 50 MG/ML
12.5 INJECTION INTRAMUSCULAR; INTRAVENOUS EVERY 6 HOURS PRN
Status: ACTIVE | OUTPATIENT
Start: 2024-09-06 | End: 2024-09-07

## 2024-09-06 RX ORDER — OXYCODONE HYDROCHLORIDE 5 MG/1
10 TABLET ORAL EVERY 4 HOURS PRN
Status: DISCONTINUED | OUTPATIENT
Start: 2024-09-07 | End: 2024-09-08 | Stop reason: HOSPADM

## 2024-09-06 RX ORDER — DIPHENHYDRAMINE HYDROCHLORIDE 50 MG/ML
12.5 INJECTION INTRAMUSCULAR; INTRAVENOUS
Status: DISCONTINUED | OUTPATIENT
Start: 2024-09-06 | End: 2024-09-06 | Stop reason: HOSPADM

## 2024-09-06 RX ORDER — ONDANSETRON 2 MG/ML
4 INJECTION INTRAMUSCULAR; INTRAVENOUS EVERY 6 HOURS PRN
Status: DISCONTINUED | OUTPATIENT
Start: 2024-09-07 | End: 2024-09-08 | Stop reason: HOSPADM

## 2024-09-06 RX ORDER — OXYCODONE HYDROCHLORIDE 5 MG/1
10 TABLET ORAL EVERY 4 HOURS PRN
Status: ACTIVE | OUTPATIENT
Start: 2024-09-06 | End: 2024-09-07

## 2024-09-06 RX ORDER — OXYCODONE HCL 5 MG/5 ML
10 SOLUTION, ORAL ORAL
Status: DISCONTINUED | OUTPATIENT
Start: 2024-09-06 | End: 2024-09-06 | Stop reason: HOSPADM

## 2024-09-06 RX ORDER — DIPHENHYDRAMINE HYDROCHLORIDE 50 MG/ML
25 INJECTION INTRAMUSCULAR; INTRAVENOUS EVERY 6 HOURS PRN
Status: ACTIVE | OUTPATIENT
Start: 2024-09-06 | End: 2024-09-07

## 2024-09-06 RX ORDER — DEXAMETHASONE SODIUM PHOSPHATE 4 MG/ML
INJECTION, SOLUTION INTRA-ARTICULAR; INTRALESIONAL; INTRAMUSCULAR; INTRAVENOUS; SOFT TISSUE PRN
Status: DISCONTINUED | OUTPATIENT
Start: 2024-09-06 | End: 2024-09-06 | Stop reason: SURG

## 2024-09-06 RX ORDER — ONDANSETRON 4 MG/1
4 TABLET, ORALLY DISINTEGRATING ORAL EVERY 6 HOURS PRN
Status: DISCONTINUED | OUTPATIENT
Start: 2024-09-07 | End: 2024-09-08 | Stop reason: HOSPADM

## 2024-09-06 RX ORDER — CITRIC ACID/SODIUM CITRATE 334-500MG
30 SOLUTION, ORAL ORAL ONCE
Status: COMPLETED | OUTPATIENT
Start: 2024-09-06 | End: 2024-09-06

## 2024-09-06 RX ORDER — OXYCODONE HYDROCHLORIDE 5 MG/1
5 TABLET ORAL EVERY 4 HOURS PRN
Status: DISPENSED | OUTPATIENT
Start: 2024-09-06 | End: 2024-09-07

## 2024-09-06 RX ORDER — KETOROLAC TROMETHAMINE 15 MG/ML
INJECTION, SOLUTION INTRAMUSCULAR; INTRAVENOUS PRN
Status: DISCONTINUED | OUTPATIENT
Start: 2024-09-06 | End: 2024-09-06 | Stop reason: SURG

## 2024-09-06 RX ORDER — DOCUSATE SODIUM 100 MG/1
100 CAPSULE, LIQUID FILLED ORAL 2 TIMES DAILY PRN
Status: DISCONTINUED | OUTPATIENT
Start: 2024-09-06 | End: 2024-09-08 | Stop reason: HOSPADM

## 2024-09-06 RX ORDER — SODIUM CHLORIDE, SODIUM LACTATE, POTASSIUM CHLORIDE, CALCIUM CHLORIDE 600; 310; 30; 20 MG/100ML; MG/100ML; MG/100ML; MG/100ML
INJECTION, SOLUTION INTRAVENOUS PRN
Status: DISCONTINUED | OUTPATIENT
Start: 2024-09-06 | End: 2024-09-08 | Stop reason: HOSPADM

## 2024-09-06 RX ORDER — METOPROLOL TARTRATE 1 MG/ML
1 INJECTION, SOLUTION INTRAVENOUS
Status: DISCONTINUED | OUTPATIENT
Start: 2024-09-06 | End: 2024-09-06 | Stop reason: HOSPADM

## 2024-09-06 RX ORDER — ACETAMINOPHEN 500 MG
1000 TABLET ORAL EVERY 6 HOURS PRN
Status: DISCONTINUED | OUTPATIENT
Start: 2024-09-10 | End: 2024-09-08

## 2024-09-06 RX ORDER — VITAMIN A ACETATE, BETA CAROTENE, ASCORBIC ACID, CHOLECALCIFEROL, .ALPHA.-TOCOPHEROL ACETATE, DL-, THIAMINE MONONITRATE, RIBOFLAVIN, NIACINAMIDE, PYRIDOXINE HYDROCHLORIDE, FOLIC ACID, CYANOCOBALAMIN, CALCIUM CARBONATE, FERROUS FUMARATE, ZINC OXIDE, CUPRIC OXIDE 3080; 12; 120; 400; 1; 1.84; 3; 20; 22; 920; 25; 200; 27; 10; 2 [IU]/1; UG/1; MG/1; [IU]/1; MG/1; MG/1; MG/1; MG/1; MG/1; [IU]/1; MG/1; MG/1; MG/1; MG/1; MG/1
1 TABLET, FILM COATED ORAL
Status: DISCONTINUED | OUTPATIENT
Start: 2024-09-06 | End: 2024-09-08 | Stop reason: HOSPADM

## 2024-09-06 RX ORDER — CALCIUM CARBONATE 500 MG/1
1000 TABLET, CHEWABLE ORAL EVERY 6 HOURS PRN
Status: DISCONTINUED | OUTPATIENT
Start: 2024-09-06 | End: 2024-09-08 | Stop reason: HOSPADM

## 2024-09-06 RX ORDER — ALBUTEROL SULFATE 5 MG/ML
2.5 SOLUTION RESPIRATORY (INHALATION)
Status: DISCONTINUED | OUTPATIENT
Start: 2024-09-06 | End: 2024-09-06 | Stop reason: HOSPADM

## 2024-09-06 RX ORDER — ACETAMINOPHEN 500 MG
1000 TABLET ORAL EVERY 6 HOURS
Status: DISCONTINUED | OUTPATIENT
Start: 2024-09-07 | End: 2024-09-08 | Stop reason: HOSPADM

## 2024-09-06 RX ORDER — LABETALOL HYDROCHLORIDE 5 MG/ML
5 INJECTION, SOLUTION INTRAVENOUS
Status: DISCONTINUED | OUTPATIENT
Start: 2024-09-06 | End: 2024-09-06 | Stop reason: HOSPADM

## 2024-09-06 RX ORDER — OXYTOCIN 10 [USP'U]/ML
10 INJECTION, SOLUTION INTRAMUSCULAR; INTRAVENOUS
Status: DISCONTINUED | OUTPATIENT
Start: 2024-09-06 | End: 2024-09-08 | Stop reason: HOSPADM

## 2024-09-06 RX ORDER — SODIUM CHLORIDE, SODIUM GLUCONATE, SODIUM ACETATE, POTASSIUM CHLORIDE AND MAGNESIUM CHLORIDE 526; 502; 368; 37; 30 MG/100ML; MG/100ML; MG/100ML; MG/100ML; MG/100ML
500 INJECTION, SOLUTION INTRAVENOUS CONTINUOUS
Status: DISCONTINUED | OUTPATIENT
Start: 2024-09-06 | End: 2024-09-06

## 2024-09-06 RX ORDER — MEPERIDINE HYDROCHLORIDE 25 MG/ML
12.5 INJECTION INTRAMUSCULAR; INTRAVENOUS; SUBCUTANEOUS
Status: DISCONTINUED | OUTPATIENT
Start: 2024-09-06 | End: 2024-09-06 | Stop reason: HOSPADM

## 2024-09-06 RX ORDER — ONDANSETRON 2 MG/ML
4 INJECTION INTRAMUSCULAR; INTRAVENOUS
Status: DISCONTINUED | OUTPATIENT
Start: 2024-09-06 | End: 2024-09-06 | Stop reason: HOSPADM

## 2024-09-06 RX ORDER — HYDROMORPHONE HYDROCHLORIDE 1 MG/ML
0.4 INJECTION, SOLUTION INTRAMUSCULAR; INTRAVENOUS; SUBCUTANEOUS
Status: DISCONTINUED | OUTPATIENT
Start: 2024-09-06 | End: 2024-09-06 | Stop reason: HOSPADM

## 2024-09-06 RX ORDER — DIPHENHYDRAMINE HYDROCHLORIDE 50 MG/ML
25 INJECTION INTRAMUSCULAR; INTRAVENOUS EVERY 6 HOURS PRN
Status: DISCONTINUED | OUTPATIENT
Start: 2024-09-07 | End: 2024-09-08 | Stop reason: HOSPADM

## 2024-09-06 RX ORDER — HYDROMORPHONE HYDROCHLORIDE 1 MG/ML
0.2 INJECTION, SOLUTION INTRAMUSCULAR; INTRAVENOUS; SUBCUTANEOUS
Status: ACTIVE | OUTPATIENT
Start: 2024-09-06 | End: 2024-09-07

## 2024-09-06 RX ORDER — SIMETHICONE 125 MG
125 TABLET,CHEWABLE ORAL 4 TIMES DAILY PRN
Status: DISCONTINUED | OUTPATIENT
Start: 2024-09-06 | End: 2024-09-08 | Stop reason: HOSPADM

## 2024-09-06 RX ORDER — BUPIVACAINE HYDROCHLORIDE 7.5 MG/ML
INJECTION, SOLUTION INTRASPINAL PRN
Status: DISCONTINUED | OUTPATIENT
Start: 2024-09-06 | End: 2024-09-06 | Stop reason: SURG

## 2024-09-06 RX ORDER — HYDROMORPHONE HYDROCHLORIDE 1 MG/ML
0.4 INJECTION, SOLUTION INTRAMUSCULAR; INTRAVENOUS; SUBCUTANEOUS
Status: ACTIVE | OUTPATIENT
Start: 2024-09-06 | End: 2024-09-07

## 2024-09-06 RX ORDER — MORPHINE SULFATE 0.5 MG/ML
INJECTION, SOLUTION EPIDURAL; INTRATHECAL; INTRAVENOUS PRN
Status: DISCONTINUED | OUTPATIENT
Start: 2024-09-06 | End: 2024-09-06 | Stop reason: SURG

## 2024-09-06 RX ORDER — EPHEDRINE SULFATE 50 MG/ML
5 INJECTION, SOLUTION INTRAVENOUS
Status: DISCONTINUED | OUTPATIENT
Start: 2024-09-06 | End: 2024-09-06 | Stop reason: HOSPADM

## 2024-09-06 RX ORDER — CEFAZOLIN SODIUM 1 G/3ML
INJECTION, POWDER, FOR SOLUTION INTRAMUSCULAR; INTRAVENOUS PRN
Status: DISCONTINUED | OUTPATIENT
Start: 2024-09-06 | End: 2024-09-06 | Stop reason: SURG

## 2024-09-06 RX ORDER — SODIUM CHLORIDE, SODIUM GLUCONATE, SODIUM ACETATE, POTASSIUM CHLORIDE AND MAGNESIUM CHLORIDE 526; 502; 368; 37; 30 MG/100ML; MG/100ML; MG/100ML; MG/100ML; MG/100ML
INJECTION, SOLUTION INTRAVENOUS
Status: DISCONTINUED | OUTPATIENT
Start: 2024-09-06 | End: 2024-09-06 | Stop reason: SURG

## 2024-09-06 RX ORDER — ONDANSETRON 2 MG/ML
4 INJECTION INTRAMUSCULAR; INTRAVENOUS EVERY 6 HOURS PRN
Status: ACTIVE | OUTPATIENT
Start: 2024-09-06 | End: 2024-09-07

## 2024-09-06 RX ORDER — OXYCODONE HYDROCHLORIDE 5 MG/1
5 TABLET ORAL EVERY 4 HOURS PRN
Status: DISCONTINUED | OUTPATIENT
Start: 2024-09-07 | End: 2024-09-08 | Stop reason: HOSPADM

## 2024-09-06 RX ORDER — ONDANSETRON 2 MG/ML
INJECTION INTRAMUSCULAR; INTRAVENOUS PRN
Status: DISCONTINUED | OUTPATIENT
Start: 2024-09-06 | End: 2024-09-06 | Stop reason: HOSPADM

## 2024-09-06 RX ORDER — OXYCODONE HCL 5 MG/5 ML
5 SOLUTION, ORAL ORAL
Status: DISCONTINUED | OUTPATIENT
Start: 2024-09-06 | End: 2024-09-06 | Stop reason: HOSPADM

## 2024-09-06 RX ORDER — DIPHENHYDRAMINE HCL 25 MG
25 TABLET ORAL EVERY 6 HOURS PRN
Status: DISCONTINUED | OUTPATIENT
Start: 2024-09-07 | End: 2024-09-08 | Stop reason: HOSPADM

## 2024-09-06 RX ORDER — IBUPROFEN 800 MG/1
800 TABLET, FILM COATED ORAL EVERY 8 HOURS
Status: DISCONTINUED | OUTPATIENT
Start: 2024-09-07 | End: 2024-09-08 | Stop reason: HOSPADM

## 2024-09-06 RX ORDER — ACETAMINOPHEN 500 MG
1000 TABLET ORAL EVERY 6 HOURS
Status: COMPLETED | OUTPATIENT
Start: 2024-09-06 | End: 2024-09-07

## 2024-09-06 RX ORDER — IBUPROFEN 800 MG/1
800 TABLET, FILM COATED ORAL EVERY 8 HOURS PRN
Status: DISCONTINUED | OUTPATIENT
Start: 2024-09-10 | End: 2024-09-08

## 2024-09-06 RX ORDER — SODIUM CHLORIDE, SODIUM GLUCONATE, SODIUM ACETATE, POTASSIUM CHLORIDE AND MAGNESIUM CHLORIDE 526; 502; 368; 37; 30 MG/100ML; MG/100ML; MG/100ML; MG/100ML; MG/100ML
1500 INJECTION, SOLUTION INTRAVENOUS CONTINUOUS
Status: DISCONTINUED | OUTPATIENT
Start: 2024-09-06 | End: 2024-09-06

## 2024-09-06 RX ORDER — HYDROMORPHONE HYDROCHLORIDE 1 MG/ML
0.2 INJECTION, SOLUTION INTRAMUSCULAR; INTRAVENOUS; SUBCUTANEOUS
Status: DISCONTINUED | OUTPATIENT
Start: 2024-09-06 | End: 2024-09-06 | Stop reason: HOSPADM

## 2024-09-06 RX ORDER — CEFAZOLIN SODIUM 1 G/3ML
2 INJECTION, POWDER, FOR SOLUTION INTRAMUSCULAR; INTRAVENOUS ONCE
Status: DISCONTINUED | OUTPATIENT
Start: 2024-09-06 | End: 2024-09-06 | Stop reason: HOSPADM

## 2024-09-06 RX ORDER — MIDAZOLAM HYDROCHLORIDE 1 MG/ML
1 INJECTION INTRAMUSCULAR; INTRAVENOUS
Status: DISCONTINUED | OUTPATIENT
Start: 2024-09-06 | End: 2024-09-06 | Stop reason: HOSPADM

## 2024-09-06 RX ADMIN — ONDANSETRON 4 MG: 2 INJECTION INTRAMUSCULAR; INTRAVENOUS at 09:08

## 2024-09-06 RX ADMIN — MORPHINE SULFATE 150 MCG: 0.5 INJECTION, SOLUTION EPIDURAL; INTRATHECAL; INTRAVENOUS at 09:03

## 2024-09-06 RX ADMIN — KETOROLAC TROMETHAMINE 15 MG: 15 INJECTION, SOLUTION INTRAMUSCULAR; INTRAVENOUS at 09:54

## 2024-09-06 RX ADMIN — ACETAMINOPHEN 1000 MG: 500 TABLET ORAL at 12:25

## 2024-09-06 RX ADMIN — SODIUM CHLORIDE, SODIUM GLUCONATE, SODIUM ACETATE, POTASSIUM CHLORIDE AND MAGNESIUM CHLORIDE: 526; 502; 368; 37; 30 INJECTION, SOLUTION INTRAVENOUS at 08:56

## 2024-09-06 RX ADMIN — SODIUM CHLORIDE, SODIUM GLUCONATE, SODIUM ACETATE, POTASSIUM CHLORIDE AND MAGNESIUM CHLORIDE: 526; 502; 368; 37; 30 INJECTION, SOLUTION INTRAVENOUS at 09:28

## 2024-09-06 RX ADMIN — DOCUSATE SODIUM 100 MG: 100 CAPSULE, LIQUID FILLED ORAL at 17:57

## 2024-09-06 RX ADMIN — PHENYLEPHRINE HYDROCHLORIDE 0.5 MCG/KG/MIN: 10 INJECTION INTRAVENOUS at 09:04

## 2024-09-06 RX ADMIN — BUPIVACAINE HYDROCHLORIDE IN DEXTROSE 12 MG: 7.5 INJECTION, SOLUTION SUBARACHNOID at 09:03

## 2024-09-06 RX ADMIN — FAMOTIDINE 20 MG: 10 INJECTION, SOLUTION INTRAVENOUS at 08:38

## 2024-09-06 RX ADMIN — CEFAZOLIN 2 G: 1 INJECTION, POWDER, FOR SOLUTION INTRAMUSCULAR; INTRAVENOUS at 09:06

## 2024-09-06 RX ADMIN — SCOPOLAMINE 1 PATCH: 1.5 PATCH, EXTENDED RELEASE TRANSDERMAL at 09:32

## 2024-09-06 RX ADMIN — OXYTOCIN 125 ML/HR: 10 INJECTION, SOLUTION INTRAMUSCULAR; INTRAVENOUS at 10:27

## 2024-09-06 RX ADMIN — FENTANYL CITRATE 15 MCG: 50 INJECTION, SOLUTION INTRAMUSCULAR; INTRAVENOUS at 09:03

## 2024-09-06 RX ADMIN — SODIUM CITRATE AND CITRIC ACID MONOHYDRATE 30 ML: 334; 500 SOLUTION ORAL at 08:37

## 2024-09-06 RX ADMIN — ACETAMINOPHEN 1000 MG: 500 TABLET ORAL at 17:56

## 2024-09-06 RX ADMIN — KETOROLAC TROMETHAMINE 15 MG: 15 INJECTION, SOLUTION INTRAMUSCULAR; INTRAVENOUS at 22:06

## 2024-09-06 RX ADMIN — DEXAMETHASONE SODIUM PHOSPHATE 4 MG: 4 INJECTION INTRA-ARTICULAR; INTRALESIONAL; INTRAMUSCULAR; INTRAVENOUS; SOFT TISSUE at 09:12

## 2024-09-06 RX ADMIN — SODIUM CHLORIDE, SODIUM GLUCONATE, SODIUM ACETATE, POTASSIUM CHLORIDE AND MAGNESIUM CHLORIDE 1500 ML: 526; 502; 368; 37; 30 INJECTION, SOLUTION INTRAVENOUS at 06:30

## 2024-09-06 RX ADMIN — KETOROLAC TROMETHAMINE 15 MG: 15 INJECTION, SOLUTION INTRAMUSCULAR; INTRAVENOUS at 16:00

## 2024-09-06 RX ADMIN — OXYTOCIN 20 UNITS: 10 INJECTION, SOLUTION INTRAMUSCULAR; INTRAVENOUS at 09:28

## 2024-09-06 RX ADMIN — METOCLOPRAMIDE 10 MG: 5 INJECTION, SOLUTION INTRAMUSCULAR; INTRAVENOUS at 08:38

## 2024-09-06 ASSESSMENT — EDINBURGH POSTNATAL DEPRESSION SCALE (EPDS)
I HAVE FELT SAD OR MISERABLE: NOT VERY OFTEN
I HAVE BEEN SO UNHAPPY THAT I HAVE BEEN CRYING: NO, NEVER
I HAVE LOOKED FORWARD WITH ENJOYMENT TO THINGS: AS MUCH AS I EVER DID
I HAVE FELT SCARED OR PANICKY FOR NO GOOD REASON: NO, NOT MUCH
THINGS HAVE BEEN GETTING ON TOP OF ME: NO, MOST OF THE TIME I HAVE COPED QUITE WELL
I HAVE BEEN SO UNHAPPY THAT I HAVE HAD DIFFICULTY SLEEPING: NOT AT ALL
THE THOUGHT OF HARMING MYSELF HAS OCCURRED TO ME: NEVER
I HAVE BEEN ANXIOUS OR WORRIED FOR NO GOOD REASON: HARDLY EVER
I HAVE BLAMED MYSELF UNNECESSARILY WHEN THINGS WENT WRONG: NOT VERY OFTEN
I HAVE BEEN ABLE TO LAUGH AND SEE THE FUNNY SIDE OF THINGS: AS MUCH AS I ALWAYS COULD

## 2024-09-06 ASSESSMENT — LIFESTYLE VARIABLES
EVER_SMOKED: NEVER
ALCOHOL_USE: NO

## 2024-09-06 ASSESSMENT — PAIN DESCRIPTION - PAIN TYPE
TYPE: ACUTE PAIN;SURGICAL PAIN
TYPE: ACUTE PAIN
TYPE: ACUTE PAIN;SURGICAL PAIN
TYPE: SURGICAL PAIN
TYPE: SURGICAL PAIN

## 2024-09-06 ASSESSMENT — PAIN SCALES - GENERAL: PAIN_LEVEL: 2

## 2024-09-06 ASSESSMENT — PATIENT HEALTH QUESTIONNAIRE - PHQ9
1. LITTLE INTEREST OR PLEASURE IN DOING THINGS: NOT AT ALL
2. FEELING DOWN, DEPRESSED, IRRITABLE, OR HOPELESS: NOT AT ALL
SUM OF ALL RESPONSES TO PHQ9 QUESTIONS 1 AND 2: 0

## 2024-09-06 NOTE — PROGRESS NOTES
1140- patient assessment done.  Patient oriented to room and call light.  Condition will continue to be monitored.

## 2024-09-06 NOTE — ANESTHESIA TIME REPORT
Anesthesia Start and Stop Event Times       Date Time Event    9/6/2024 0821 Ready for Procedure     0856 Anesthesia Start     1003 Anesthesia Stop          Responsible Staff  09/06/24      Name Role Begin End    Ovidio Brewer M.D. Anesth 0856 1003          Overtime Reason:  no overtime (within assigned shift)    Comments:

## 2024-09-06 NOTE — ANESTHESIA PREPROCEDURE EVALUATION
Case: [id number] Date/Time: 24    Procedure: REPEAT  SECTION    Pre-op diagnosis: PRIOR C SECTION, 39.6W    Location: D OR  / SURGERY LABOR AND DELIVERY    Surgeons: Giancarlo Moreno M.D.            Relevant Problems   No relevant active problems       Physical Exam    Airway   Mallampati: II  TM distance: >3 FB  Neck ROM: full       Cardiovascular - normal exam  Rhythm: regular  Rate: normal  (-) murmur     Dental - normal exam           Pulmonary - normal exam  Breath sounds clear to auscultation     Abdominal    Neurological - normal exam                   Anesthesia Plan    ASA 2       Plan - spinal   Neuraxial block will be primary anesthetic                Postoperative Plan: Postoperative administration of opioids is intended.    Pertinent diagnostic labs and testing reviewed    Informed Consent:    Anesthetic plan and risks discussed with patient.

## 2024-09-06 NOTE — OR SURGEON
Immediate Post OP Note    PreOp Diagnosis:     1.  39 at 6/7th week gestation.  2.  Two previous  sections.  3.  Group B streptococcus positive.      PostOp Diagnosis:     1.  39 at 6/7th week gestation.  2.  Two previous  sections.  3.  Group B streptococcus positive.  4.  Anterior cul-de-sac adhesions      Procedure(s):  REPEAT  SECTION    Surgeon(s):  ASHLEY Fulton M.D.    Anesthesiologist/Type of Anesthesia:  Anesthesiologist: Ovidio Brewer M.D./* No anesthesia type entered *    Surgical Staff:  * No surgical staff found *    Specimens removed if any:  * No specimens in log *    Estimated Blood Loss: 600 cc    Findings: baby-female, APGARs 8-9, 3230 grams    Complications: none        2024 10:05 AM Giancarlo Moreno M.D.

## 2024-09-06 NOTE — CARE PLAN
The patient is Stable - Low risk of patient condition declining or worsening    Shift Goals  Clinical Goals: remain clinically stable  Patient Goals: healthy mom, healthy baby  Family Goals: support    Progress made toward(s) clinical / shift goals:  Patient has scant to light lochia with a firm palpable uterus.  Vital signs are within defined limits.  Patient will ask for pain medication when needed. Assessment will continue.     Patient is not progressing towards the following goals:

## 2024-09-06 NOTE — ANESTHESIA POSTPROCEDURE EVALUATION
Patient: Chelle Richardson    Procedure Summary       Date: 24 Room / Location: [facility] OR 01 / SURGERY LABOR AND DELIVERY    Anesthesia Start: 856 Anesthesia Stop:     Procedure: REPEAT  SECTION (Abdomen) Diagnosis: (PRIOR C SECTION, 39.6W, delivered)    Surgeons: Giancarlo Moreno M.D. Responsible Provider: Ovidio Brewer M.D.    Anesthesia Type: spinal ASA Status: 2            Final Anesthesia Type: spinal  Last vitals  BP   Blood Pressure: 119/78    Temp   36.9 °C (98.4 °F)    Pulse   86   Resp   16    SpO2   97 %      Anesthesia Post Evaluation    Patient location during evaluation: PACU  Patient participation: complete - patient participated  Level of consciousness: awake and alert  Pain score: 2    Airway patency: patent  Anesthetic complications: no  Cardiovascular status: hemodynamically stable  Respiratory status: acceptable  Hydration status: euvolemic    PONV: none          There were no known notable events for this encounter.     Nurse Pain Score: 2 (NPRS)

## 2024-09-06 NOTE — CARE PLAN
Problem: Pain  Goal: Patient's pain will be alleviated or reduced to the patient’s comfort goal  Outcome: Progressing  Flowsheets (Taken 9/6/2024 0750)  OB Pain Level: 0-No Pain  Note: Document pain using the appropriate pain scale per order or unit policy. Administer pain medications per provider order and/or assist with epidural/spinal placement as needed. Pain management medications as ordered. Educate and implement non-pharmacologic comfort measures (i.e. relaxation, distraction, massage, cold/heat therapy, etc.).     Problem: Risk for Infection and Impaired Wound Healing  Goal: Patient will remain free from infection  Outcome: Progressing  Note: Utilize Standard Precautions at all times to reduce the risk of transmission of microorganisms from both recognized and unrecognized sources of infection. Infection prevention handouts provided (general/device/diagnosis specific) and documented in Patient Education. Administer antibiotic therapy per provider order. Assess for removal of lines and drains. Line/drain care per policy and/or provider orders.     The patient is Stable - Low risk of patient condition declining or worsening    Shift Goals  Clinical Goals: healthy mom, healthy baby  Patient Goals: healthy mom, healthy baby  Family Goals: support    Progress made toward(s) clinical / shift goals: Progressing

## 2024-09-06 NOTE — ANESTHESIA PROCEDURE NOTES
Spinal Block    Date/Time: 9/6/2024 9:00 AM    Performed by: Ovidio Brewer M.D.  Authorized by: Ovidio Brewer M.D.    Patient Location:  OR  Start Time:  9/6/2024 9:00 AM  End Time:  9/6/2024 9:03 AM  Reason for Block: primary anesthetic    patient identified, IV checked, site marked, risks and benefits discussed, surgical consent, monitors and equipment checked, pre-op evaluation and timeout performed    Patient Position:  Sitting  Prep: ChloraPrep, patient draped and sterile technique    Monitoring:  Blood pressure, continuous pulse oximetry and heart rate  Approach:  Midline  Location:  L3-4  Injection Technique:  Single-shot  Skin infiltration:  Lidocaine  Strength:  1%  Dose:  3ml  Needle Type:  Pencan  Needle Gauge:  25 G  CSF flowing pre/post injection:  Yes  Sensory Level:  T4

## 2024-09-06 NOTE — PROGRESS NOTES
0600- Pt presents to L&D for scheduled repeat  section. Pt reports normal fetal movement and no LOF. Oriented pt to the room and reviewed POC. Pt verbalizes understanding and states no questions at this time. Pt prepped for surgery.     0855- Pt transported to OR.    0928- Delivery of viable female infant, APGARs 8/9.    1001- Pt transported to PACU.     1120- Pt transported to postpartum unit via gurney with female infant in arms. FOB following with personal belongings. Report given to Shahana BRIGHT, care relinquished at this time.

## 2024-09-06 NOTE — OP REPORT
DATE OF SERVICE:  2024     OPERATION:  Repeat low transverse  section with adhesiolysis.     SURGEON:  Giancarlo Moreno MD     ASSISTANT:  Johana Rausch MD     ANESTHESIOLOGIST:  Ovidio Brewer MD     ANESTHESIA:  Spinal.     PREOPERATIVE DIAGNOSES:  1.  A 39 and 6/7th week gestation.  2.  Two previous  sections.  3.  Group B streptococcus positive.     POSTOPERATIVE DIAGNOSES:  1.  A 39 and 6/7th week gestation.  2.  Two previous  sections.  3.  Group B streptococcus positive.  4.  Anterior cul-de-sac adhesions.     COMPLICATIONS:  None.     ESTIMATED BLOOD LOSS:  600 mL.     FINDINGS:  Baby--- female, 1 minute Apgar 8, 5 minute Apgar 9, weight 3230 grams.     INDICATIONS:  This 29-year-old lady is now .  She came for repeat    at term after a history of 2 previous C-sections.  She is known to   be Group B streptococcus positive.  The surgery was done with intact   membranes.  Preop fetal monitoring was reassuring.     OPERATION:  The patient went to the OR.  Spinal anesthesia was administered.    She was prepped and draped.  Timeout was done.  I made a small Pfannenstiel   skin incision by excising her Pfannenstiel scar.  I incised a very thin layer   of subcutaneous fat.  I incised the rectus fascia transversely.  I    the fascia from the rectus muscles with scissors.  I entered the peritoneum   well away from the bladder.  The peritoneal incision was enlarged.  An Durga   O retractor was placed.  Filmy anterior cul-de-sac post- adhesions   were completely freed up with Metzenbaum scissors.  I used a sponge covered   fingertip to make sure the bladder was pushed away from my proposed   hysterotomy site.  I made a low transverse myometrial incision.  The lower   uterus was not unusually thin.  I pierced the membranes with a hemostat,   revealing clear fluid.  Baby's head was extracted from right occiput   transverse position.  Vacuum extractor was   placed on the median flexion point for six seconds with no pop-offs, to   assist with delivery.  Maximum vacuum pressure was 50 cm Hg. The vacuum was released.  I bulb suctioned the baby's   mouth.  There were no nuchal cords.  The shoulders and body delivered easily.    Thirty seconds later, the cord was doubly clamped and cut.  The baby was   handed off.  The intact placenta and all trailing membranes delivered   spontaneously.  I sponge curetted the endometrial cavity to make sure there   were no retained products of conception.  I closed two-thirds of the   myometrial thickness with running interlocking 0 Vicryl.  I placed a second   layer of 0 Vicryl, closing the remainder of the myometrium and imbricating the   first layer.  A hemostatic figure-of-eight 0 Vicryl suture was applied to the   midline of the incision, resulting in excellent approximation and hemostasis.    Both fallopian tubes and ovaries were inspected.  There were no adnexal   masses evident.  The Durga O was removed.  I placed Seprafilm adhesion   barrier over the anterior cul-de-sac.  I closed the anterior parietal   peritoneum and the posterior layer of the rectus sheath with running 2-0   Vicryl.  I placed Seprafilm adhesion barrier over the rectus muscles.  I   closed both layers of the rectus abdominis fascia with running 0 PDS.  I   closed Geetha's fascia with running 3-0 Vicryl.  I closed the skin with Insorb   resorbable subcutaneous staples, 1/2 inch wide Steri-Strips and a Mepilex Ag   dressing were placed.  Sponge and needle counts were correct.        ______________________________  MD HOLLY Rawls/MARGARITA    DD:  09/06/2024 10:12  DT:  09/06/2024 10:21    Job#:  [id number]    CC:Johana Rausch MD

## 2024-09-07 PROCEDURE — A9270 NON-COVERED ITEM OR SERVICE: HCPCS | Performed by: OBSTETRICS & GYNECOLOGY

## 2024-09-07 PROCEDURE — 770002 HCHG ROOM/CARE - OB PRIVATE (112)

## 2024-09-07 PROCEDURE — A9270 NON-COVERED ITEM OR SERVICE: HCPCS | Performed by: ANESTHESIOLOGY

## 2024-09-07 PROCEDURE — 700111 HCHG RX REV CODE 636 W/ 250 OVERRIDE (IP): Mod: JZ | Performed by: ANESTHESIOLOGY

## 2024-09-07 PROCEDURE — 700102 HCHG RX REV CODE 250 W/ 637 OVERRIDE(OP): Performed by: ANESTHESIOLOGY

## 2024-09-07 PROCEDURE — 700102 HCHG RX REV CODE 250 W/ 637 OVERRIDE(OP): Performed by: OBSTETRICS & GYNECOLOGY

## 2024-09-07 RX ADMIN — PRENATAL WITH FERROUS FUM AND FOLIC ACID 1 TABLET: 3080; 920; 120; 400; 22; 1.84; 3; 20; 10; 1; 12; 200; 27; 25; 2 TABLET ORAL at 10:02

## 2024-09-07 RX ADMIN — ACETAMINOPHEN 1000 MG: 500 TABLET ORAL at 01:13

## 2024-09-07 RX ADMIN — ACETAMINOPHEN 1000 MG: 500 TABLET ORAL at 06:15

## 2024-09-07 RX ADMIN — OXYCODONE HYDROCHLORIDE 5 MG: 5 TABLET ORAL at 01:16

## 2024-09-07 RX ADMIN — OXYCODONE HYDROCHLORIDE 5 MG: 5 TABLET ORAL at 20:00

## 2024-09-07 RX ADMIN — IBUPROFEN 800 MG: 800 TABLET, FILM COATED ORAL at 15:47

## 2024-09-07 RX ADMIN — DOCUSATE SODIUM 100 MG: 100 CAPSULE, LIQUID FILLED ORAL at 06:30

## 2024-09-07 RX ADMIN — KETOROLAC TROMETHAMINE 15 MG: 15 INJECTION, SOLUTION INTRAMUSCULAR; INTRAVENOUS at 04:08

## 2024-09-07 RX ADMIN — ACETAMINOPHEN 1000 MG: 500 TABLET ORAL at 15:47

## 2024-09-07 RX ADMIN — IBUPROFEN 800 MG: 800 TABLET, FILM COATED ORAL at 23:31

## 2024-09-07 RX ADMIN — SIMETHICONE 125 MG: 125 TABLET, CHEWABLE ORAL at 10:02

## 2024-09-07 RX ADMIN — ACETAMINOPHEN 1000 MG: 500 TABLET ORAL at 22:13

## 2024-09-07 RX ADMIN — KETOROLAC TROMETHAMINE 15 MG: 15 INJECTION, SOLUTION INTRAMUSCULAR; INTRAVENOUS at 09:55

## 2024-09-07 ASSESSMENT — PAIN DESCRIPTION - PAIN TYPE
TYPE: ACUTE PAIN
TYPE: SURGICAL PAIN
TYPE: ACUTE PAIN;SURGICAL PAIN
TYPE: ACUTE PAIN;SURGICAL PAIN
TYPE: SURGICAL PAIN
TYPE: SURGICAL PAIN
TYPE: ACUTE PAIN;SURGICAL PAIN
TYPE: ACUTE PAIN

## 2024-09-07 NOTE — PROGRESS NOTES
Assessment completed, all findings within normal limits with the exception of: Heart rate which is low at 54 BPM. POC discussed and all questions answered. No other needs at this time.

## 2024-09-07 NOTE — PROGRESS NOTES
"62-74\" abdominal binder sent to tube station 34    Contact traction for any questions or concerns.   "

## 2024-09-07 NOTE — PROGRESS NOTES
POD 1---      UO adequate  No flatus yet, tolerating clear liquids  Well, denies N/V    LAB:       24 06:30 24 20:00   WBC 8.5 13.7 (H)   Hemoglobin 12.8 10.9 (L)   Hematocrit 36.6 (L) 31.6 (L)   Platelet Count 229 192       PE:     Afebrile  BP normal    Lungs clear to auscultation  Abdomen soft, flat, bowel sounds present and normal  Wound dressing in place, waterproof barrier intact  Calves nontender, Sheldon sign negative bilaterally  Back:  No CVA tenderness     PLAN: Postop care, advance diet as tolerated, increase activity as tolerated.

## 2024-09-07 NOTE — CARE PLAN
The patient is Stable - Low risk of patient condition declining or worsening    Shift Goals  Clinical Goals: Maintain stable condition  Patient Goals: Rest  Family Goals: support    Progress made toward(s) clinical / shift goals:    Problem: Pain  Goal: Patient's pain will be alleviated or reduced to the patient’s comfort goal  Outcome: Progressing  Note: Patient's pain is managed with scheduled and PRN pharmacological interventions..       Vital signs are stable.

## 2024-09-08 ENCOUNTER — PHARMACY VISIT (OUTPATIENT)
Dept: PHARMACY | Facility: MEDICAL CENTER | Age: 29
End: 2024-09-08
Payer: COMMERCIAL

## 2024-09-08 VITALS
RESPIRATION RATE: 17 BRPM | BODY MASS INDEX: 32.99 KG/M2 | DIASTOLIC BLOOD PRESSURE: 75 MMHG | HEIGHT: 65 IN | TEMPERATURE: 97.4 F | OXYGEN SATURATION: 96 % | HEART RATE: 60 BPM | WEIGHT: 198 LBS | SYSTOLIC BLOOD PRESSURE: 133 MMHG

## 2024-09-08 PROBLEM — G89.18 POSTOPERATIVE PAIN: Status: ACTIVE | Noted: 2024-09-08

## 2024-09-08 PROCEDURE — RXMED WILLOW AMBULATORY MEDICATION CHARGE: Performed by: OBSTETRICS & GYNECOLOGY

## 2024-09-08 PROCEDURE — 700102 HCHG RX REV CODE 250 W/ 637 OVERRIDE(OP): Performed by: OBSTETRICS & GYNECOLOGY

## 2024-09-08 PROCEDURE — A9270 NON-COVERED ITEM OR SERVICE: HCPCS | Performed by: OBSTETRICS & GYNECOLOGY

## 2024-09-08 RX ORDER — IBUPROFEN 800 MG/1
800 TABLET, FILM COATED ORAL EVERY 8 HOURS PRN
Qty: 21 TABLET | Refills: 0 | Status: SHIPPED | OUTPATIENT
Start: 2024-09-08 | End: 2024-09-15

## 2024-09-08 RX ORDER — PRENATAL VIT 49/IRON FUM/FOLIC 6.75-0.2MG
1 TABLET ORAL DAILY
Status: SHIPPED
Start: 2024-09-08

## 2024-09-08 RX ORDER — ACETAMINOPHEN 500 MG
500-1000 TABLET ORAL EVERY 6 HOURS PRN
COMMUNITY
Start: 2024-09-08

## 2024-09-08 RX ADMIN — IBUPROFEN 800 MG: 800 TABLET, FILM COATED ORAL at 10:01

## 2024-09-08 RX ADMIN — ACETAMINOPHEN 1000 MG: 500 TABLET ORAL at 04:57

## 2024-09-08 RX ADMIN — PRENATAL WITH FERROUS FUM AND FOLIC ACID 1 TABLET: 3080; 920; 120; 400; 22; 1.84; 3; 20; 10; 1; 12; 200; 27; 25; 2 TABLET ORAL at 10:01

## 2024-09-08 ASSESSMENT — PAIN DESCRIPTION - PAIN TYPE
TYPE: SURGICAL PAIN
TYPE: SURGICAL PAIN

## 2024-09-08 NOTE — PROGRESS NOTES
Pt education and discharge instructions reviewed with pt and pt states understanding.  Pt states that all questions have been answered and denies any problems. Pt discharged home in stable condition with all belongings.

## 2024-09-08 NOTE — DISCHARGE SUMMARY
Discharge Summary:      Chelle Richardson    Admit Date:   2024  Discharge Date:  2024     Admitting diagnosis:  Indication for care in labor or delivery [O75.9]  Discharge Diagnosis: Status post  for repeat.  Pregnancy Complications:     POSTOPERATIVE DIAGNOSES:  1.  A 39 and 6/7th week gestation.  2.  Two previous  sections.  3.  Group B streptococcus positive.  4.  Anterior cul-de-sac adhesions.    Tubal Ligation:  no        History:  Past Medical History:   Diagnosis Date    Allergy     seasonal    Heart burn     Exists during pregnancy. Non specific to surgery    Irregular periods 2018     IMO load 2020    Pre-conception counseling 2018    Pregnant     C-sections; repeat c/section scheduled for 2024    Seasonal allergies     Suicidal thoughts 2020     OB History    Para Term  AB Living   3 3 1     3   SAB IAB Ectopic Molar Multiple Live Births           0 3      # Outcome Date GA Lbr Herminio/2nd Weight Sex Type Anes PTL Lv   3 Term 24 39w6d  3.23 kg (7 lb 1.9 oz) F CS-LTranv Spinal N SOLITARIO   2 Para 21   3.76 kg (8 lb 4.6 oz) M CS-LTranv  N SOLITARIO   1 Para                 Patient has no known allergies.  Patient Active Problem List    Diagnosis Date Noted    Postoperative pain 2024    Indication for care in labor or delivery 2024    Twitching 2023    Post-dates pregnancy 2021    Uterine scar from previous  delivery affecting pregnancy 2021    Positive GBS test 2021    Non-dilated cervix in term pregnancy 2021    Depression with anxiety 2020    Severe episode of recurrent major depressive disorder (HCC) 2020    Close exposure to COVID-19 virus 2020    Anemia 2020    History of hemolysis, elevated liver enzymes, and low platelet (HELLP) syndrome 2020    History of PCOS 2020    Tingling in extremities 2020    Chronic seasonal allergic rhinitis 2018         Hospital Course:   29 y.o.  G3, now para 3, was admitted with the above mentioned diagnosis, underwent , repeat  . Patient postpartum course was unremarkable, with progressive advancement in diet , ambulation and toleration of oral analgesia. Patient without complaints today and desires discharge.      Vitals:    24 0200 24 0552 24 1704 24 0602   BP: 106/54 110/56 124/64 133/75   Pulse: (!) 55 (!) 54  60   Resp: 18 18 20 17   Temp: 36.7 °C (98 °F) 36.8 °C (98.2 °F) 37.1 °C (98.8 °F) 36.3 °C (97.4 °F)   TempSrc: Temporal Temporal Temporal Temporal   SpO2: 96% 95% 96% 96%   Weight:       Height:           Current Facility-Administered Medications   Medication Dose    oxytocin (Pitocin) infusion (for post delivery)  125 mL/hr    oxytocin (Pitocin) injection 10 Units  10 Units    lactated ringers infusion      ibuprofen (Motrin) tablet 800 mg  800 mg    acetaminophen (Tylenol) tablet 1,000 mg  1,000 mg    oxyCODONE immediate-release (Roxicodone) tablet 5 mg  5 mg    oxyCODONE immediate-release (Roxicodone) tablet 10 mg  10 mg    ondansetron (Zofran) syringe/vial injection 4 mg  4 mg    Or    ondansetron (Zofran ODT) dispertab 4 mg  4 mg    diphenhydrAMINE (Benadryl) tablet/capsule 25 mg  25 mg    Or    diphenhydrAMINE (Benadryl) injection 25 mg  25 mg    docusate sodium (Colace) capsule 100 mg  100 mg    magnesium hydroxide (Milk Of Magnesia) suspension 30 mL  30 mL    prenatal plus vitamin (Stuartnatal 1+1) 27-1 MG tablet 1 Tablet  1 Tablet    simethicone (Mylicon) chewable tablet 125 mg  125 mg    calcium carbonate (Tums) chewable tab 1,000 mg  1,000 mg       Exam:  Breast Exam: Inspection negative. No nipple discharge or bleeding. No masses or nodularity palpable  Abdomen: Abdomen soft, non-tender. BS normal. No masses,  No organomegaly  Fundus Non Tender: yes  Incision: no evidence of infection, separation or keloid formation.  Perineum: perineum intact  Extremity:  extremities, peripheral pulses and reflexes normal     Labs:  Recent Labs     09/06/24  0630 09/06/24 2000   WBC 8.5 13.7*   RBC 4.01* 3.42*   HEMOGLOBIN 12.8 10.9*   HEMATOCRIT 36.6* 31.6*   MCV 91.3 92.4   MCH 31.9 31.9   MCHC 35.0 34.5   RDW 42.0 43.0   PLATELETCT 229 192   MPV 11.2 11.4        Activity:   Discharge to home  Pelvic Rest x 6 weeks    Assessment:  normal postpartum course  Discharge Assessment: Voiding without difficulty     Follow up: Dr Moreno, 2 weeks.      Discharge Meds:   Current Outpatient Medications   Medication Sig Dispense Refill    Prenatal 6.75-0.2 MG Tab Take 1 Tablet by mouth every day.      acetaminophen (TYLENOL) 500 MG Tab Take 1-2 Tablets by mouth every 6 hours as needed for Mild Pain or Moderate Pain.      ibuprofen (MOTRIN) 800 MG Tab Take 1 Tablet by mouth every 8 hours as needed for Mild Pain or Moderate Pain for up to 7 days. 21 Tablet 0       Giancarlo Moreno M.D.

## 2024-09-08 NOTE — PROGRESS NOTES
Assessment completed WDL. Pt states that pain is well controlled with current medications. Plan of care discussed and pt encouraged to call with needs.

## 2024-09-08 NOTE — CARE PLAN
Problem: Knowledge Deficit - Postpartum  Goal: Patient will verbalize and demonstrate understanding of self and infant care  Outcome: Progressing     Problem: Altered Physiologic Condition  Goal: Patient physiologically stable as evidenced by normal lochia, palpable uterine involution and vitals within normal limits  Outcome: Progressing   The patient is Stable - Low risk of patient condition declining or worsening    Shift Goals  Clinical Goals: stable VS and pain control  Patient Goals: feed baby  Family Goals: support    Progress made toward(s) clinical / shift goals:    Pt reports comfort after pain interventions, Fundus firm and lochia light, VSS, educated on POC, needs met at this time. Questions answered. Will continue to educate.  Pt reports comfort after pain interventions, Fundus firm and lochia light, VSS, educated on POC, needs met at this time. Questions answered. Will continue to educate.

## 2024-09-08 NOTE — DISCHARGE INSTRUCTIONS
PATIENT DISCHARGE EDUCATION INSTRUCTION SHEET    REASONS TO CALL YOUR OBSTETRICIAN  Persistent fever, shaking, chills (Temperature higher than 100.4) may indicate you have an infection  Heavy bleeding: soaking more than 1 pad per hour; Passing clots an egg-sized clot or bigger may mean you have an postpartum hemorrhage  Foul odor from vagina or bad smelling or discolored discharge or blood  Breast infection (Mastitis symptoms); breast pain, chills, fever, redness or red streaks, may feel flu like symptoms  Urinary pain, burning or frequency  Incision that is not healing, increased redness, swelling, tenderness or pain, or any pus from episiotomy or  site may mean you have an infection  Redness, swelling, warmth, or painful to touch in the calf area of your leg may mean you have a blood clot  Severe or intensified depression, thoughts or feelings of wanting to hurt yourself or someone else   Pain in chest, obstructed breathing or shortness of breath (trouble catching your breath) may mean you are having a postpartum complication. Call your provider immediately   Headache that does not get better, even after taking medicine, a bad headache with vision changes or pain in the upper right area of your belly may mean you have high blood pressure or post birth preeclampsia. Call your provider immediately    HAND WASHING  All family and friends should wash their hands:  Before and after holding the baby  Before feeding the baby  After using the restroom or changing the baby's diaper    WOUND CARE  Ask your physician for additional care instructions. In general:   Incision:  May shower and pat incision dry   Keep the incision clean and dry  There should not be any opening or pus from the incision  Continue to walk at home 3 times a day   Do NOT lift anything heavier than your baby (over 10 pounds)  Encourage family to help participate in care of the  to allow rest and mom time to heal    VAGINAL CARE  "AND BLEEDING  Nothing inside vagina for 6 weeks:   No sexual intercourse, tampons or douching  Bleeding may continue for 2-4 weeks. Amount and color may vary  Soaking 1 pad or more in an hour for several hours is considered heavy bleeding  Passing large egg sized blood clots can be concerning  If you feel like you have heavy bleeding or are having increasing amount of blood clots call your Obstetrician immediately  If you begin feeling faint upon standing, feeling sick to your stomach, have clammy skin, a really fast heartbeat, have chills, start feeling confused, dizzy, sleepy or weak, or feeling like you're going to faint call your Obstetrician immediately    HYPERTENSION   Preeclampsia or gestational hypertension are types of high blood pressure that only pregnant women can get. It is important for you to be aware of symptoms to seek early intervention and treatment. If you have any of these symptoms immediately call your Obstetrician    Vision changes or blurred vision   Severe headache or pain that is unrelieved with medication and will not go away  Persistent pain in upper abdomen or shoulder   Increased swelling of face, feet, or hands  Difficulty breathing or shortness of breath at rest  Urinating less than usual    URINATION AND BOWEL MOVEMENTS  Eating more fiber (bran cereal, fruits, and vegetables) and drinking plenty of fluids will help to avoid constipation  Urinary frequency and urgency after childbirth is normal  If you experience any urinary pain, burning or frequency call your provider    BIRTH CONTROL  It is possible to become pregnant at any time after delivery and while breastfeeding  Plan to discuss a method of birth control with your physician at your post delivery follow up visit    POSTPARTUM BLUES  During the first few days after birth, you may experience a sense of the \"blues\" which may include impatience, irritability or even crying. These feelings come and go quickly. However, as many as " "1 in 10 women experience emotional symptoms known as postpartum depression.     POSTPARTUM DEPRESSION    May start as early as the second or third day after delivery or take several weeks or months to develop. Symptoms of \"blues\" are present, but are more intense: Crying spells; loss of appetite; feelings of hopelessness or loss of control; fear of touching the baby; over concern or no concern at all about the baby; little or no concern about your own appearance/caring for yourself; and/or inability to sleep or excessive sleeping. Contact your Obstetrician if you are experiencing any of these symptoms     PREVENTING SHAKEN BABY  If you are angry or stressed, PUT THE BABY IN THE CRIB, step away, take some deep breaths, and wait until you are calm to care for the baby. DO NOT SHAKE THE BABY. You are not alone, call a supporter for help.  [facility] 24/7 crisis call line (085-491-8316) or (1-136.767.3221)  You can also text them, text \"ANSWER\" (636665)  "

## 2024-09-08 NOTE — CARE PLAN
The patient is Stable - Low risk of patient condition declining or worsening    Shift Goals  Clinical Goals: pain control; rest  Patient Goals: feed baby  Family Goals: support    Progress made toward(s) clinical / shift goals:  Pain controlled through PRN and scheduled pain medication administrations. Pt slept with minimal interruptions for care.    Patient is not progressing towards the following goals:

## 2024-09-08 NOTE — LACTATION NOTE
This note was copied from a baby's chart.  Initial Consult:     Spoke briefly with Chelle, who reports she  her two prior children without difficulty.     She reports breastfeeding is going well with this baby. She denies any significant breast/nipple discomfort, and does not wish latch assist/assessment at this time.      [facility] Breastfeeding Resources handout provided and outpatient lactation care and support [place] options reviewed.     Chelle has [employer] insurance through her spouse. Provided [employer] breast pump packet for [employer] and discussed with pt how to get a pump through [facility] [facility]/[employer].     PLAN:    Skin to skin when either parent awake and alert.    Offer breast whenever hunger cues noted.    If baby sleeps more than 3 hours, wake baby and offer breast.    If baby not waking for feeding at least every 3 hours, hand express and spoon/cup feed back EBM to baby.

## 2024-09-08 NOTE — PROGRESS NOTES
1927: Received bedside report from day shift RNXi. Greeted pt at the bedside. Whiteboard updated.     1950: Completed assessment. Pt complains of pain at this time. Pain medication was administered. Removed IV, per order. POC discussed: pain control, lochia, hydration, and ambulation. Reinforced education on emergency and non-emergency call light system, and bed safety.     Educated pt to press the red emergency button on the side rails if she experiences:  Sudden dizziness  Gushing of blood  Soaking a pad front and back within an hour  Passing a clot bigger than an egg size  Infant cyanotic    Pt verbalized understanding. Call light placed within reach.

## 2024-09-08 NOTE — LACTATION NOTE
Followup visit  MOB reports baby is showing hunger cues and latching independently. She reports tender but intact nipples and reports she is working on deep latches with baby during every feeding.   She reports she  her other 2 children with plentiful supply and is aware of post d/c bfdg resources. Reviewed stool changes expected by day 5.

## 2024-09-08 NOTE — PROGRESS NOTES
POD 2---       UO adequate  + flatus, tolerating regular diet well, denies N/V    LAB:         24 06:30 24 20:00   WBC 8.5 13.7 (H)   Hemoglobin 12.8 10.9 (L)   Hematocrit 36.6 (L) 31.6 (L)   Platelet Count 229 192        PE:   Afebrile  BP normal    Lungs clear to auscultation  Abdomen soft, flat, bowel sounds present and normal  Wound dressing in place, waterproof barrier intact  Calves nontender, Sheldon sign negative bilaterally  Back:  No CVA tenderness     PLAN: Postop care, analgesia as needed, diet as tolerated,  activity as tolerated. Patient planning on discharge:  today .    Prescriptions:   PNV, tylenol, ibuprofen.  Declines opioid Rx.  Followup plans:   2 wks .

## 2024-10-18 ENCOUNTER — HOSPITAL ENCOUNTER (OUTPATIENT)
Facility: MEDICAL CENTER | Age: 29
End: 2024-10-18
Attending: OBSTETRICS & GYNECOLOGY
Payer: COMMERCIAL

## 2024-10-18 PROCEDURE — 88142 CYTOPATH C/V THIN LAYER: CPT

## 2024-10-18 PROCEDURE — 87591 N.GONORRHOEAE DNA AMP PROB: CPT

## 2024-10-18 PROCEDURE — 87491 CHLMYD TRACH DNA AMP PROBE: CPT

## 2024-10-27 LAB — THINPREP PAP, CYTOLOGY NL11781: NORMAL

## (undated) DEVICE — ELECTRODE DUAL RETURN W/ CORD - (50/PK)

## (undated) DEVICE — TUBING CLEARLINK DUO-VENT - C-FLO (48EA/CA)

## (undated) DEVICE — BARRIER ADH SEPRAFILM 4 SECTION (10/BX)

## (undated) DEVICE — GLOVE BIOGEL SZ 8.5 SURGICAL PF LTX - (50PR/BX 4BX/CA)

## (undated) DEVICE — SUTURE 3-0 VICRYL PLUS CT-1 - 36 INCH (36/BX)

## (undated) DEVICE — RETRACTOR O C SECTION LRY - (5/BX)

## (undated) DEVICE — CATHETER IV NON-SAFETY 18 GA X 1 1/4 (50/BX 4BX/CA)

## (undated) DEVICE — DRESSING POST OP BORDER 4 X 10 (5EA/BX)

## (undated) DEVICE — SODIUM CHL IRRIGATION 0.9% 1000ML (12EA/CA)

## (undated) DEVICE — SUTURE 2-0 VICRYL PLUS CT-1 36 (36PK/BX)"

## (undated) DEVICE — SUTURE 4-0 VICRYL PLUS FS-1 - 27 INCH (36/BX)

## (undated) DEVICE — PACK C-SECTION (2EA/CA)

## (undated) DEVICE — WATER IRRIGATION STERILE 1000ML (12EA/CA)

## (undated) DEVICE — HEAD HOLDER JUNIOR/ADULT

## (undated) DEVICE — DETERGENT RENUZYME PLUS 10 OZ PACKET (50/BX)

## (undated) DEVICE — SYSTEM VACUUM DELIVERY KIWI

## (undated) DEVICE — SET EXTENSION WITH 2 PORTS (48EA/CA) ***PART #2C8610 IS A SUBSTITUTE*****

## (undated) DEVICE — SUTURE 0 VICRYL PLUS CT-1 - 36 INCH (36/BX)

## (undated) DEVICE — SUTURE 4-0 VICRYL PLUSFS-1 - 27 INCH (36/BX)

## (undated) DEVICE — KIT  I.V. START (100EA/CA)

## (undated) DEVICE — CLOSURE SKIN STRIP 1/2 X 4 IN - (STERI STRIP) (50/BX 4BX/CA)

## (undated) DEVICE — GLOVE BIOGEL SZ 6.5 SURGICAL PF LTX (50PR/BX 4BX/CA)

## (undated) DEVICE — SUTURE 0 36IN PDS + VIO CT-1 (36PK/BX)

## (undated) DEVICE — PAD LAP STERILE 18 X 18 - (5/PK 40PK/CA)

## (undated) DEVICE — 0 CHROMIC CT-1

## (undated) DEVICE — SLEEVE, SEQUENTIAL CALF REG

## (undated) DEVICE — EXTRACTOR SKIN STAPLER - (12EA/BX)

## (undated) DEVICE — TRAY SPINAL ANESTHESIA NON-SAFETY (10/CA)

## (undated) DEVICE — WATER IRRIG. STER. 1500 ML - (9/CA)

## (undated) DEVICE — SWABSTICK BENZOIN SINGLE (50EA/BX)